# Patient Record
Sex: FEMALE | Race: WHITE | NOT HISPANIC OR LATINO | Employment: FULL TIME | URBAN - METROPOLITAN AREA
[De-identification: names, ages, dates, MRNs, and addresses within clinical notes are randomized per-mention and may not be internally consistent; named-entity substitution may affect disease eponyms.]

---

## 2019-09-11 ENCOUNTER — OFFICE VISIT (OUTPATIENT)
Dept: URGENT CARE | Facility: MEDICAL CENTER | Age: 24
End: 2019-09-11
Payer: COMMERCIAL

## 2019-09-11 VITALS
WEIGHT: 150 LBS | DIASTOLIC BLOOD PRESSURE: 70 MMHG | SYSTOLIC BLOOD PRESSURE: 108 MMHG | TEMPERATURE: 99.5 F | HEIGHT: 64 IN | OXYGEN SATURATION: 98 % | HEART RATE: 106 BPM | BODY MASS INDEX: 25.61 KG/M2 | RESPIRATION RATE: 16 BRPM

## 2019-09-11 DIAGNOSIS — H60.501 ACUTE OTITIS EXTERNA OF RIGHT EAR, UNSPECIFIED TYPE: ICD-10-CM

## 2019-09-11 DIAGNOSIS — H66.001 ACUTE SUPPURATIVE OTITIS MEDIA OF RIGHT EAR WITHOUT SPONTANEOUS RUPTURE OF TYMPANIC MEMBRANE, RECURRENCE NOT SPECIFIED: ICD-10-CM

## 2019-09-11 PROCEDURE — 99203 OFFICE O/P NEW LOW 30 MIN: CPT | Performed by: NURSE PRACTITIONER

## 2019-09-11 RX ORDER — AMOXICILLIN 500 MG/1
500 CAPSULE ORAL 2 TIMES DAILY
Qty: 20 CAP | Refills: 0 | Status: SHIPPED | OUTPATIENT
Start: 2019-09-11 | End: 2019-09-21

## 2019-09-11 RX ORDER — ETONOGESTREL/ETHINYL ESTRADIOL .12-.015MG
0.12 RING, VAGINAL VAGINAL DAILY
Refills: 0 | COMMUNITY
Start: 2019-08-28 | End: 2021-11-11

## 2019-09-11 SDOH — HEALTH STABILITY: MENTAL HEALTH: HOW OFTEN DO YOU HAVE A DRINK CONTAINING ALCOHOL?: MONTHLY OR LESS

## 2019-09-11 ASSESSMENT — ENCOUNTER SYMPTOMS
DIARRHEA: 0
SINUS PAIN: 0
ABDOMINAL PAIN: 0
RHINORRHEA: 0
VOMITING: 0
TINGLING: 0
SORE THROAT: 0
COUGH: 0
HEADACHES: 0
NECK PAIN: 0
NAUSEA: 0
DIZZINESS: 0
CHILLS: 1
FEVER: 0

## 2019-09-11 ASSESSMENT — LIFESTYLE VARIABLES: SUBSTANCE_ABUSE: 0

## 2019-09-11 NOTE — PROGRESS NOTES
Subjective:      Kyleigh Adams is a 23 y.o. female who presents with Otalgia ((R) x 1 day, stabbing pain)    Reviewed past medical, surgical and family history. Reviewed prescription and OTC medications with patient in electronic health record today.     No Known Allergies          Otalgia    There is pain in the right ear. This is a new problem. The current episode started in the past 7 days. The problem occurs constantly. The problem has been gradually worsening. The pain is at a severity of 6/10 (throbbing and sharp). The pain is moderate. Pertinent negatives include no abdominal pain, coughing, diarrhea, ear discharge, headaches, hearing loss, neck pain, rhinorrhea, sore throat or vomiting. She has tried acetaminophen for the symptoms. The treatment provided mild relief. There is no history of a chronic ear infection, hearing loss or a tympanostomy tube.       Review of Systems   Constitutional: Positive for chills. Negative for fever and malaise/fatigue.   HENT: Positive for ear pain. Negative for congestion, ear discharge, hearing loss, rhinorrhea, sinus pain, sore throat and tinnitus.    Respiratory: Negative for cough.    Gastrointestinal: Negative for abdominal pain, diarrhea, nausea and vomiting.   Musculoskeletal: Negative for neck pain.   Neurological: Negative for dizziness, tingling and headaches.   Psychiatric/Behavioral: Negative for substance abuse.          Objective:     /70   Pulse (!) 106   Temp 37.5 °C (99.5 °F)   Resp 16   Wt 68 kg (150 lb)   SpO2 98%      Physical Exam   Constitutional: She is oriented to person, place, and time. Vital signs are normal. She appears well-developed and well-nourished.   HENT:   Head: Normocephalic.   Right Ear: Hearing normal. There is tenderness. Tympanic membrane is erythematous and bulging. A middle ear effusion is present.   Left Ear: Hearing, tympanic membrane, external ear and ear canal normal.   Eyes: Conjunctivae are normal.   Neck: Normal  range of motion. Neck supple.   Cardiovascular: Normal rate and regular rhythm.   Pulmonary/Chest: Effort normal and breath sounds normal. No respiratory distress.   Musculoskeletal: Normal range of motion.   Lymphadenopathy:        Head (right side): No tonsillar adenopathy present.        Head (left side): No tonsillar adenopathy present.     She has no cervical adenopathy.        Right: No supraclavicular adenopathy present.        Left: No supraclavicular adenopathy present.   Neurological: She is alert and oriented to person, place, and time. She has normal reflexes.   Skin: Skin is warm and dry. Capillary refill takes less than 2 seconds.   Psychiatric: She has a normal mood and affect. Her speech is normal and behavior is normal. Judgment and thought content normal.   Nursing note and vitals reviewed.              Assessment/Plan:       1. Acute suppurative otitis media of right ear without spontaneous rupture of tympanic membrane, recurrence not specified  amoxicillin (AMOXIL) 500 MG Cap   2. Acute otitis externa of right ear, unspecified type  amoxicillin (AMOXIL) 500 MG Cap       Educated in proper administration of medication(s) ordered today including safety, possible SE, risks, benefits, rationale and alternatives to therapy.     Return to clinic or PCP  3-4  days if current symptoms are not resolving in a satisfactory manner or sooner if new or worsening symptoms occur.   Patient was advised of signs and symptoms which would warrant further evaluation and /or emergent evaluation in ER.  Verbalized agreement with this treatment plan and seemed to understand without barriers. Questions were encouraged and answered to patients satisfaction.     Warm pack prn discomfort to ear    OTC  analgesic of choice. Follow manufactures dosing and safety precautions.

## 2020-01-13 ENCOUNTER — HOSPITAL ENCOUNTER (OUTPATIENT)
Dept: LAB | Facility: MEDICAL CENTER | Age: 25
End: 2020-01-13
Attending: OBSTETRICS & GYNECOLOGY
Payer: COMMERCIAL

## 2020-01-13 PROCEDURE — 87591 N.GONORRHOEAE DNA AMP PROB: CPT

## 2020-01-13 PROCEDURE — 87491 CHLMYD TRACH DNA AMP PROBE: CPT

## 2020-01-14 LAB
C TRACH DNA SPEC QL NAA+PROBE: NEGATIVE
N GONORRHOEA DNA SPEC QL NAA+PROBE: NEGATIVE
SPECIMEN SOURCE: NORMAL

## 2020-08-17 ENCOUNTER — TELEPHONE (OUTPATIENT)
Dept: SCHEDULING | Facility: IMAGING CENTER | Age: 25
End: 2020-08-17

## 2020-08-26 ENCOUNTER — OFFICE VISIT (OUTPATIENT)
Dept: MEDICAL GROUP | Facility: MEDICAL CENTER | Age: 25
End: 2020-08-26
Payer: COMMERCIAL

## 2020-08-26 VITALS
TEMPERATURE: 97.7 F | DIASTOLIC BLOOD PRESSURE: 74 MMHG | OXYGEN SATURATION: 96 % | HEIGHT: 64 IN | RESPIRATION RATE: 16 BRPM | BODY MASS INDEX: 26.46 KG/M2 | WEIGHT: 155 LBS | HEART RATE: 100 BPM | SYSTOLIC BLOOD PRESSURE: 112 MMHG

## 2020-08-26 DIAGNOSIS — F33.1 MODERATE EPISODE OF RECURRENT MAJOR DEPRESSIVE DISORDER (HCC): ICD-10-CM

## 2020-08-26 PROBLEM — F32.9 MAJOR DEPRESSIVE DISORDER: Status: ACTIVE | Noted: 2020-08-26

## 2020-08-26 PROCEDURE — 99203 OFFICE O/P NEW LOW 30 MIN: CPT | Performed by: NURSE PRACTITIONER

## 2020-08-26 ASSESSMENT — PATIENT HEALTH QUESTIONNAIRE - PHQ9
SUM OF ALL RESPONSES TO PHQ QUESTIONS 1-9: 17
5. POOR APPETITE OR OVEREATING: 3 - NEARLY EVERY DAY
CLINICAL INTERPRETATION OF PHQ2 SCORE: 4

## 2020-08-26 ASSESSMENT — ENCOUNTER SYMPTOMS
DEPRESSION: 1
NERVOUS/ANXIOUS: 1

## 2020-08-26 NOTE — LETTER
[facility]  SHERRY NewtonPKATIA  [address] [address]  Fax: [phone]   Authorization for Release/Disclosure of   Protected Health Information   Name: SHYLA ADAMS : 1995 SSN: [id number]   Address: [address] [place] [address] Phone:    [phone] (home)    I authorize the entity listed below to release/disclose the PHI below to:   [facility]/ELY Newton and ELY Newton   Provider or Entity Name:     Address   City, State, Zip   Phone:      Fax:     Reason for request: continuity of care   Information to be released:    [  ] LAST COLONOSCOPY,  including any PATH REPORT and follow-up  [  ] LAST FIT/COLOGUARD RESULT [  ] LAST DEXA  [  ] LAST MAMMOGRAM  [  ] LAST PAP  [  ] LAST LABS [  ] RETINA EXAM REPORT  [  ] IMMUNIZATION RECORDS  [  ] Release all info      [  ] Check here and initial the line next to each item to release ALL health information INCLUDING  _____ Care and treatment for drug and / or alcohol abuse  _____ HIV testing, infection status, or AIDS  _____ Genetic Testing    DATES OF SERVICE OR TIME PERIOD TO BE DISCLOSED: _____________  I understand and acknowledge that:  * This Authorization may be revoked at any time by you in writing, except if your health information has already been used or disclosed.  * Your health information that will be used or disclosed as a result of you signing this authorization could be re-disclosed by the recipient. If this occurs, your re-disclosed health information may no longer be protected by State or Federal laws.  * You may refuse to sign this Authorization. Your refusal will not affect your ability to obtain treatment.  * This Authorization becomes effective upon signing and will  on (date) __________.      If no date is indicated, this Authorization will  one (1) year from the signature date.    Name: Shyla Adams    Signature:   Date:     2020       PLEASE FAX REQUESTED RECORDS BACK  TO: [phone]

## 2020-08-26 NOTE — PROGRESS NOTES
Subjective:      Kyleigh Adams is a 24 y.o. female who presents with Establish Care        CC: Patient is here today to establish care and for issues with anxiety and depression.    HPI         1. Moderate episode of recurrent major depressive disorder (HCC)  Patient reports that she was diagnosed with depression in high school and was on Lexapro for about 8 months which she found somewhat helpful.  She had been doing well up until COVID-19 started.  She states no she has persisting anxiety and depression.  Her PHQ 9 score is at 17.  She states she is not suicidal or homicidal.  She would like to start back on antidepressant medicines and go to counseling.  She currently works as an  at [employer] and is working mostly from home.  No past medical history on file.  Social History     Socioeconomic History   • Marital status:      Spouse name: Not on file   • Number of children: Not on file   • Years of education: Not on file   • Highest education level: Not on file   Occupational History   • Not on file   Social Needs   • Financial resource strain: Not on file   • Food insecurity     Worry: Not on file     Inability: Not on file   • Transportation needs     Medical: Not on file     Non-medical: Not on file   Tobacco Use   • Smoking status: Never Smoker   • Smokeless tobacco: Never Used   Substance and Sexual Activity   • Alcohol use: Yes     Frequency: Monthly or less   • Drug use: Never   • Sexual activity: Yes     Partners: Male     Birth control/protection: Inserts   Lifestyle   • Physical activity     Days per week: Not on file     Minutes per session: Not on file   • Stress: Not on file   Relationships   • Social connections     Talks on phone: Not on file     Gets together: Not on file     Attends [employer] service: Not on file     Active member of club or organization: Not on file     Attends meetings of clubs or organizations: Not on file     Relationship status: Not on file   •  "Intimate partner violence     Fear of current or ex partner: Not on file     Emotionally abused: Not on file     Physically abused: Not on file     Forced sexual activity: Not on file   Other Topics Concern   • Not on file   Social History Narrative   • Not on file     Current Outpatient Medications   Medication Sig Dispense Refill   • sertraline (ZOLOFT) 50 MG Tab Take 1 Tab by mouth every day. 90 Tab 3   • NUVARING 0.12-0.015 MG/24HR vaginal ring Insert 0.12 mg in vagina every day.  0     No current facility-administered medications for this visit.      Family History   Problem Relation Age of Onset   • Thyroid Mother    • Hypertension Father          Review of Systems   Psychiatric/Behavioral: Positive for depression. The patient is nervous/anxious.    All other systems reviewed and are negative.         Objective:     /74 (BP Location: Right arm, Patient Position: Sitting, BP Cuff Size: Adult)   Pulse 100   Temp 36.5 °C (97.7 °F) (Temporal)   Resp 16   Ht 1.626 m (5' 4\")   Wt 70.3 kg (155 lb)   SpO2 96%   BMI 26.61 kg/m²      Physical Exam  Vitals signs and nursing note reviewed.   Constitutional:       General: She is not in acute distress.     Appearance: She is well-developed. She is not diaphoretic.   HENT:      Head: Normocephalic and atraumatic.      Right Ear: External ear normal.      Left Ear: External ear normal.      Nose: Nose normal.   Eyes:      General:         Right eye: No discharge.         Left eye: No discharge.   Neck:      Musculoskeletal: Normal range of motion and neck supple.      Thyroid: No thyromegaly.   Cardiovascular:      Rate and Rhythm: Normal rate and regular rhythm.      Heart sounds: Normal heart sounds. No murmur. No friction rub. No gallop.    Pulmonary:      Effort: Pulmonary effort is normal.      Breath sounds: Normal breath sounds. No wheezing or rales.   Musculoskeletal:         General: No tenderness.   Skin:     General: Skin is warm and dry.      " Findings: No rash.   Neurological:      Mental Status: She is alert and oriented to person, place, and time.      Deep Tendon Reflexes: Reflexes normal.   Psychiatric:         Behavior: Behavior normal.         Thought Content: Thought content normal.         Judgment: Judgment normal.      Comments: Patient appears in no emotional distress in the office today.                 Assessment/Plan:        1. Moderate episode of recurrent major depressive disorder (HCC)  Patient's PHQ 9 score shows her to be with moderately severe depression as well as complaints of anxiety.  We discussed various options and she would like to try counseling.  I also discussed with her SSRI medication and she would be willing to try Zoloft.  She will start on half a tablet daily for the first week and then go to a full tablet.    Depression teaching included:    Discussed in depth with patient the pro's and con's of antidepressant therapy. I explained that it may take 2-4 wekks for the medication to take effect. Side effects discussed. Some people can have increased depression on these medications. If you should develope any homicidal or suicidal thoughts, you need to go to the emergency room immediatly for evaluation and possible admission. Otherwise I would like to see you back in two weeks to evaluate treatment success.    You should also start or continue counseling while on medication because antidepressents are a adjunct to treatment, not a substitute.  - Patient has been identified as having a positive depression screening. Appropriate orders and counseling have been given.  - REFERRAL TO PSYCHOLOGY  - sertraline (ZOLOFT) 50 MG Tab; Take 1 Tab by mouth every day.  Dispense: 90 Tab; Refill: 3

## 2021-01-20 ENCOUNTER — APPOINTMENT (OUTPATIENT)
Dept: RADIOLOGY | Facility: MEDICAL CENTER | Age: 26
End: 2021-01-20
Attending: EMERGENCY MEDICINE
Payer: COMMERCIAL

## 2021-01-20 ENCOUNTER — HOSPITAL ENCOUNTER (EMERGENCY)
Facility: MEDICAL CENTER | Age: 26
End: 2021-01-20
Attending: EMERGENCY MEDICINE
Payer: COMMERCIAL

## 2021-01-20 ENCOUNTER — NURSE TRIAGE (OUTPATIENT)
Dept: HEALTH INFORMATION MANAGEMENT | Facility: OTHER | Age: 26
End: 2021-01-20

## 2021-01-20 VITALS
OXYGEN SATURATION: 95 % | HEIGHT: 64 IN | RESPIRATION RATE: 16 BRPM | HEART RATE: 102 BPM | SYSTOLIC BLOOD PRESSURE: 111 MMHG | WEIGHT: 153.44 LBS | DIASTOLIC BLOOD PRESSURE: 73 MMHG | TEMPERATURE: 98.9 F | BODY MASS INDEX: 26.2 KG/M2

## 2021-01-20 DIAGNOSIS — S32.10XA CLOSED FRACTURE OF SACRUM, UNSPECIFIED PORTION OF SACRUM, INITIAL ENCOUNTER (HCC): ICD-10-CM
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
## 2021-01-20 PROCEDURE — 72220 X-RAY EXAM SACRUM TAILBONE: CPT

## 2021-01-20 PROCEDURE — 700102 HCHG RX REV CODE 250 W/ 637 OVERRIDE(OP): Performed by: EMERGENCY MEDICINE

## 2021-01-20 PROCEDURE — 99284 EMERGENCY DEPT VISIT MOD MDM: CPT

## 2021-01-20 PROCEDURE — A9270 NON-COVERED ITEM OR SERVICE: HCPCS | Performed by: EMERGENCY MEDICINE

## 2021-01-20 PROCEDURE — 72100 X-RAY EXAM L-S SPINE 2/3 VWS: CPT

## 2021-01-20 RX ORDER — HYDROCODONE BITARTRATE AND ACETAMINOPHEN 5; 325 MG/1; MG/1
1 TABLET ORAL EVERY 6 HOURS PRN
Qty: 15 TAB | Refills: 0 | Status: SHIPPED | OUTPATIENT
Start: 2021-01-20 | End: 2021-01-25

## 2021-01-20 RX ORDER — HYDROCODONE BITARTRATE AND ACETAMINOPHEN 5; 325 MG/1; MG/1
1 TABLET ORAL ONCE
Status: COMPLETED | OUTPATIENT
Start: 2021-01-20 | End: 2021-01-20

## 2021-01-20 RX ADMIN — HYDROCODONE BITARTRATE AND ACETAMINOPHEN 1 TABLET: 5; 325 TABLET ORAL at 18:32

## 2021-01-20 RX ADMIN — IBUPROFEN 800 MG: 600 TABLET ORAL at 18:33

## 2021-01-20 NOTE — TELEPHONE ENCOUNTER
"Pt fell down stairs today. Pt did not hit head per report. Pt is feeling dizziness and lightheadedness. Pt also has slight tingling in hands and lower back pain. Advised UC/ED per protocol. Pt scheduled appointment for UC.    Reason for Disposition  • SEVERE dizziness (e.g., unable to stand, requires support to walk, feels like passing out now)    Additional Information  • Negative: Shock suspected (e.g., cold/pale/clammy skin, too weak to stand, low BP, rapid pulse)  • Negative: Difficult to awaken or acting confused (e.g., disoriented, slurred speech)  • Negative: Fainted, and still feels dizzy afterwards  • Negative: SEVERE difficulty breathing (e.g., struggling for each breath, speaks in single words)  • Negative: Overdose (accidental or intentional) of medications  • Negative: New neurologic deficit that is present now: * Weakness of the face, arm, or leg on one side of the body * Numbness of the face, arm, or leg on one side of the body * Loss of speech or garbled speech  • Negative: Heart beating < 50 beats per minute OR > 140 beats per minute  • Negative: Sounds like a life-threatening emergency to the triager  • Negative: Chest pain  • Negative: Rectal bleeding, bloody stool, or tarry-black stool  • Negative: Vomiting is the main symptom  • Negative: Diarrhea is the main symptom  • Negative: Headache is the main symptom  • Negative: Heat exhaustion suspected (i.e., dehydration from heat exposure)  • Negative: Patient states that he/she is having an anxiety/panic attack    Answer Assessment - Initial Assessment Questions  1. DESCRIPTION: \"Describe your dizziness.\"      When she gets up to go anywhere  2. LIGHTHEADED: \"Do you feel lightheaded?\" (e.g., somewhat faint, woozy, weak upon standing)      unknown  3. VERTIGO: \"Do you feel like either you or the room is spinning or tilting?\" (i.e. vertigo)      no  4. SEVERITY: \"How bad is it?\"  \"Do you feel like you are going to faint?\" \"Can you stand and walk?\"    " "- MILD - walking normally    - MODERATE - interferes with normal activities (e.g., work, school)     - SEVERE - unable to stand, requires support to walk, feels like passing out now.       MODERATE  5. ONSET:  \"When did the dizziness begin?\"      today  6. AGGRAVATING FACTORS: \"Does anything make it worse?\" (e.g., standing, change in head position)      standing  7. HEART RATE: \"Can you tell me your heart rate?\" \"How many beats in 15 seconds?\"  (Note: not all patients can do this)        no  8. CAUSE: \"What do you think is causing the dizziness?\"      fall  9. RECURRENT SYMPTOM: \"Have you had dizziness before?\" If so, ask: \"When was the last time?\" \"What happened that time?\"      no  10. OTHER SYMPTOMS: \"Do you have any other symptoms?\" (e.g., fever, chest pain, vomiting, diarrhea, bleeding)        nausea  11. PREGNANCY: \"Is there any chance you are pregnant?\" \"When was your last menstrual period?\"        unknown    Protocols used: DIZZINESS-A-OH      "

## 2021-01-21 NOTE — ED TRIAGE NOTES
Fell down flight of carpeted stairs. Low back pain. Had vision changes, shaking, and nausea. Now only back pain. No LOC or vomiting.

## 2021-01-21 NOTE — DISCHARGE INSTRUCTIONS
Use ice packs for the next 48 hours and then as needed for pain after that.  20 minutes on 20 minutes off as best.  Buy a donut do not sitting directly on your sacrum avoid constipation and avoid any direct pressure trauma to the area.    Do not drive, operate any machinery, or partake in dangerous activities that require maximum physical and mental performance while taking the prescribed pain killer. Do not take tylenol or tylenol containing products in addition to the pain killer that was prescibed, as the excess tylenol can cause life threatening liver problems. Do not combine this drug with alcohol or other sedatives or narcotics. Follow up with your primary care doctor in regards to the future management of this medication.

## 2021-01-21 NOTE — ED NOTES
Patient verbalized understanding of discharge instructions including order not to drive or drink alcohol for 6-12 hrs following narcotic use, no questions at this time. Pt slightly tachycardic, reports hospitals make her anxious. VS otherwise stable, patient will ambulate to exit with d/c instructions and rx in hand. Narcotic consent for discussed with pt, pt verbalized understanding and gave written consent.

## 2021-01-21 NOTE — ED PROVIDER NOTES
"ED Provider Note    CHIEF COMPLAINT  Chief Complaint   Patient presents with   • T-5000 FALL     fell down carpeted flight of stairs. Pain to low back initially and then vision changes, nausea, and shaking       HPI  Kyleigh Adams is a 25 y.o. female who presents to the emergency department chief complaint of slip and fall downstairs this morning.  She states she missed the second step and fell down several stairs mostly on her back she landed on her butt primarily and then slid down further from there.  She felt a jolting sensation upper back but did not hit her head or lose consciousness.  She states she slowly went back upstairs and lay down for a while and then was unable to sleep secondary to pain and when she stood up she got lightheaded a little early visual blackness and will nausea but not all improved after she sat back down.  She still having lower back pain no headache no current nausea no weakness numbness or tingling.  Pain is currently about a 6 out of 10 becomes worse when she sits or ambulates.    REVIEW OF SYSTEMS  Positives as above. Pertinent negatives include headache neck stiffness easy bleeding or bruising unilateral weakness numbness or tingling loss of consciousness  All other review of systems are negative    PAST MEDICAL HISTORY       SOCIAL HISTORY  Social History     Tobacco Use   • Smoking status: Never Smoker   • Smokeless tobacco: Never Used   Substance and Sexual Activity   • Alcohol use: Yes     Frequency: Monthly or less   • Drug use: Never   • Sexual activity: Yes     Partners: Male     Birth control/protection: Inserts       SURGICAL HISTORY  patient denies any surgical history    CURRENT MEDICATIONS  Home Medications    **Home medications have not yet been reviewed for this encounter**         ALLERGIES  No Known Allergies    PHYSICAL EXAM  VITAL SIGNS: /77   Pulse (!) 120   Temp 37.1 °C (98.7 °F) (Temporal)   Resp 16   Ht 1.626 m (5' 4\")   Wt 69.6 kg (153 lb 7 oz)   " LMP 01/13/2021   SpO2 96%   Breastfeeding No   BMI 26.34 kg/m²    Pulse ox interpretation: I interpret this pulse ox as normal.  Constitutional: Alert in no apparent distress.  HENT: Normocephalic atraumatic, MMM  Eyes: PER, Conjunctiva normal, Non-icteric.   Neck: Normal range of motion, No tenderness, Supple, No stridor.   Cardiovascular: Regular rate and rhythm, no murmurs.   Thorax & Lungs: Normal breath sounds, No respiratory distress, No wheezing, No chest tenderness.   Abdomen: Bowel sounds normal, Soft, No tenderness, No pulsatile masses. No peritoneal signs.  Skin: Warm, Dry, No erythema, No rash.   Back: Does have tenderness around the upper L-spine midline with no step-offs or swelling and tenderness mostly at the bottom of the sacrum without crepitus, No CVA tenderness.   Extremities/MSK: Intact equal distal pulses, No edema, No tenderness, No cyanosis, no major deformities noted  Neurologic: Alert and oriented x3,Symmetric smile, eyes shut tight bilaterally, forehead wrinkles bilaterally, sensation intact to light touch bilateral face, tongue midline, head turn and shoulder shrug with full strength. Hearing intact grossly bilaterally. 5/5  strength bilaterally, 5/5 tricep and bicep strength bilaterally. Sensation intact to light touch r, m, u, axillary nerves bilaterally. 5/5 strength quadricep, plantarflexion/dorsiflexion/extensor hallicus longus bilaterally. Sensation intact to light touch bilateral lower extremities in all nerve distributions.  Intact finger-to-nose, no pronator drift, negative rhomberg, steady gait        DIFFERENTIAL DIAGNOSIS AND WORK UP PLAN    This is a 25 y.o. female who presents with trauma falling on some stairs but did not hit her head or lose consciousness sounds like she had maybe a vasovagal episode when she went to stand after having been lying down for long time in the setting of pain.  This resolved with sitting she never syncopized no midline neck tenderness  her head neck are cleared by Nexus criteria.  She mostly has L-spine and coccyx discomfort.  She was tachycardic on arrival but is not tachycardic on my examination.  She tested Tylenol 3 hours ago we will treat her with some oral ibuprofen and ice pack and perform plain films.    DIAGNOSTIC STUDIES / PROCEDURES    RADIOLOGY  DX-LUMBAR SPINE-2 OR 3 VIEWS   Final Result      No significant spondylosis. No acute fracture or listhesis.      DX-SACRUM AND COCCYX 2+   Final Result      Acute mildly displaced fracture of the distal sacrum        The radiologist's interpretation of all radiological studies have been reviewed by me.      COURSE & MEDICAL DECISION MAKING  Pertinent Labs & Imaging studies reviewed. (See chart for details)    6:23 PM  I reassessed the patient she does have mildly displaced fracture of the distal sacrum this is past the hour or the wings and is not involving the rest of the pelvis.  Discussed with her she had a mild fracture which can take several weeks to heal we discussed buying a donut and referral to orthopedic surgery but this does not require surgical management will most likely heal on its own.  She thinks that he started with pain straining hard time resting or sleeping which I will definitely give her sugar but single dose here prior to discharge we discussed rice treatment for the first 48 hours and return precautions and to avoid constipation she understands feels comfortable going home    In prescribing controlled substances to this patient, I certify that I have obtained and reviewed the medical history of Kyleigh Adams. I have also made a good mt effort to obtain applicable records from other providers who have treated the patient and records did not demonstrate any increased risk of substance abuse that would prevent me from prescribing controlled substances.     I have conducted a physical exam and documented it. I have reviewed Ms. Adams’s prescription history as  "maintained by the [place] Prescription Monitoring Program.     I have assessed the patient’s risk for abuse, dependency, and addiction using the validated Opioid Risk Tool available at https://www.mdcalc.com/oooofq-jqwh-jfpw-ort-narcotic-abuse. 0    Given the above, I believe the benefits of controlled substance therapy outweigh the risks. The reasons for prescribing controlled substances include non-narcotic, oral analgesic alternatives have been inadequate for pain control. Accordingly, I have discussed the risk and benefits, treatment plan, and alternative therapies with the patient.     /73   Pulse (!) 102   Temp 37.2 °C (98.9 °F) (Temporal)   Resp 16   Ht 1.626 m (5' 4\")   Wt 69.6 kg (153 lb 7 oz)   LMP 01/13/2021   SpO2 95%   Breastfeeding No   BMI 26.34 kg/m²       I verified that the patient was wearing a mask and I was wearing appropriate PPE every time I entered the room. The patient's mask was on the patient at all times during my encounter except for a brief view of the oropharynx.    The patient will return for new or worsening symptoms and is stable at the time of discharge.    The patient is referred to a primary physician for blood pressure management, diabetic screening, and for all other preventative health concerns.    DISPOSITION:  Patient will be discharged home in stable condition.    FOLLOW UP:  Apolinar Griffin M.D.  [address]  [address]  [facility] [id number] [phone]    Schedule an appointment as soon as possible for a visit       [facility], Emergency Dept  [address]  [facility] [id number] [phone]    If symptoms worsen    [facility]  [address]  [address]  [facility] [address] [phone]    Schedule an appointment as soon as possible for a visit         OUTPATIENT MEDICATIONS:  New Prescriptions    HYDROCODONE-ACETAMINOPHEN (NORCO) 5-325 MG TAB PER TABLET    Take 1 Tab by mouth every 6 hours as needed for " up to 5 days.           FINAL IMPRESSION  1. Fall, initial encounter     2. Closed fracture of sacrum, Tail of sacrum, initial encounter (HCC)  HYDROcodone-acetaminophen (NORCO) 5-325 MG Tab per tablet           Electronically signed by: Emmy Julian M.D., 1/20/2021 5:39 PM    This dictation has been created using voice recognition software and/or scribes. The accuracy of the dictation is limited by the abilities of the software and the expertise of the scribes. I expect there may be some errors of grammar and possibly content. I made every attempt to manually correct the errors within my dictation. However, errors related to voice recognition software and/or scribes may still exist and should be interpreted within the appropriate context.

## 2021-01-21 NOTE — ED NOTES
Pt reports pain in lower back with movement but states that she has full ROM. Pt handed call light and declines any questions at this time.

## 2021-02-10 ENCOUNTER — HOSPITAL ENCOUNTER (OUTPATIENT)
Dept: RADIOLOGY | Facility: MEDICAL CENTER | Age: 26
End: 2021-02-10
Attending: OBSTETRICS & GYNECOLOGY
Payer: COMMERCIAL

## 2021-02-10 DIAGNOSIS — N63.10 MASS OF RIGHT BREAST: ICD-10-CM

## 2021-02-10 PROCEDURE — 76642 ULTRASOUND BREAST LIMITED: CPT | Mod: RT

## 2021-02-22 ENCOUNTER — OFFICE VISIT (OUTPATIENT)
Dept: MEDICAL GROUP | Facility: MEDICAL CENTER | Age: 26
End: 2021-02-22
Payer: COMMERCIAL

## 2021-02-22 VITALS
SYSTOLIC BLOOD PRESSURE: 124 MMHG | RESPIRATION RATE: 16 BRPM | BODY MASS INDEX: 25.44 KG/M2 | OXYGEN SATURATION: 95 % | HEIGHT: 64 IN | HEART RATE: 100 BPM | WEIGHT: 149 LBS | DIASTOLIC BLOOD PRESSURE: 72 MMHG

## 2021-02-22 DIAGNOSIS — F33.1 MODERATE EPISODE OF RECURRENT MAJOR DEPRESSIVE DISORDER (HCC): ICD-10-CM

## 2021-02-22 DIAGNOSIS — Z30.09 FAMILY PLANNING: ICD-10-CM

## 2021-02-22 DIAGNOSIS — M54.9 UPPER BACK PAIN: ICD-10-CM

## 2021-02-22 DIAGNOSIS — Z00.00 ROUTINE GENERAL MEDICAL EXAMINATION AT A HEALTH CARE FACILITY: ICD-10-CM

## 2021-02-22 PROCEDURE — 99395 PREV VISIT EST AGE 18-39: CPT | Performed by: NURSE PRACTITIONER

## 2021-02-22 ASSESSMENT — ENCOUNTER SYMPTOMS
BACK PAIN: 1
DEPRESSION: 1

## 2021-02-22 NOTE — PROGRESS NOTES
Subjective:      Kyleigh Adams is a 25 y.o. female who presents with Follow-Up        cc: Patient is here today for general checkup.    HPI   1. Routine general medical examination at a health care facility  Patient states she feels generally healthy but has had a number of medical issues this year including a mild case of COVID-19 last month and a recent sacrum fracture.  She also states that she and her  are trying to get pregnant.    2. Upper back pain  Patient states her coccyx area no longer causes her pain but she does have some mild pain in her upper back.  She states it is mostly between her scapula in the mid back but it is not severe and does not radiate into the arms or abdomen.  She is thinking of going to a chiropractor.    3. Family planning  Patient states she would like a referral to a gynecologist because she and her  plan on getting pregnant soon and she would like to get some advice.  She did have a recent diagnostic mammogram and breast ultrasound because of a potential lump found on exam but the results came back normal.    4. Moderate episode of recurrent major depressive disorder (HCC)  Patient continues on Zoloft which she has been on for a few months and has found it very helpful and wishes to continue this.      History reviewed. No pertinent past medical history.  Social History     Socioeconomic History   • Marital status:      Spouse name: Not on file   • Number of children: Not on file   • Years of education: Not on file   • Highest education level: Not on file   Occupational History   • Not on file   Tobacco Use   • Smoking status: Never Smoker   • Smokeless tobacco: Never Used   Substance and Sexual Activity   • Alcohol use: Yes   • Drug use: Never   • Sexual activity: Yes     Partners: Male     Birth control/protection: Inserts   Other Topics Concern   • Not on file   Social History Narrative   • Not on file     Social Determinants of Health     Financial  Resource Strain:    • Difficulty of Paying Living Expenses:    Food Insecurity:    • Worried About Running Out of Food in the Last Year:    • Ran Out of Food in the Last Year:    Transportation Needs:    • Lack of Transportation (Medical):    • Lack of Transportation (Non-Medical):    Physical Activity:    • Days of Exercise per Week:    • Minutes of Exercise per Session:    Stress:    • Feeling of Stress :    Social Connections:    • Frequency of Communication with Friends and Family:    • Frequency of Social Gatherings with Friends and Family:    • Attends Church Services:    • Active Member of Clubs or Organizations:    • Attends Club or Organization Meetings:    • Marital Status:    Intimate Partner Violence:    • Fear of Current or Ex-Partner:    • Emotionally Abused:    • Physically Abused:    • Sexually Abused:      Current Outpatient Medications   Medication Sig Dispense Refill   • sertraline (ZOLOFT) 50 MG Tab Take 1 Tab by mouth every day. 90 Tab 3   • NUVARING 0.12-0.015 MG/24HR vaginal ring Insert 0.12 mg in vagina every day.  0     No current facility-administered medications for this visit.     Family History   Problem Relation Age of Onset   • Thyroid Mother    • Hypertension Father        History reviewed. No pertinent past medical history.  Social History     Socioeconomic History   • Marital status:      Spouse name: Not on file   • Number of children: Not on file   • Years of education: Not on file   • Highest education level: Not on file   Occupational History   • Not on file   Tobacco Use   • Smoking status: Never Smoker   • Smokeless tobacco: Never Used   Substance and Sexual Activity   • Alcohol use: Yes   • Drug use: Never   • Sexual activity: Yes     Partners: Male     Birth control/protection: Inserts   Other Topics Concern   • Not on file   Social History Narrative   • Not on file     Social Determinants of Health     Financial Resource Strain:    • Difficulty of Paying Living  "Expenses:    Food Insecurity:    • Worried About Running Out of Food in the Last Year:    • Ran Out of Food in the Last Year:    Transportation Needs:    • Lack of Transportation (Medical):    • Lack of Transportation (Non-Medical):    Physical Activity:    • Days of Exercise per Week:    • Minutes of Exercise per Session:    Stress:    • Feeling of Stress :    Social Connections:    • Frequency of Communication with Friends and Family:    • Frequency of Social Gatherings with Friends and Family:    • Attends [employer] Services:    • Active Member of Clubs or Organizations:    • Attends Club or Organization Meetings:    • Marital Status:    Intimate Partner Violence:    • Fear of Current or Ex-Partner:    • Emotionally Abused:    • Physically Abused:    • Sexually Abused:      Current Outpatient Medications   Medication Sig Dispense Refill   • sertraline (ZOLOFT) 50 MG Tab Take 1 Tab by mouth every day. 90 Tab 3   • NUVARING 0.12-0.015 MG/24HR vaginal ring Insert 0.12 mg in vagina every day.  0     No current facility-administered medications for this visit.     Family History   Problem Relation Age of Onset   • Thyroid Mother    • Hypertension Father          Review of Systems   Musculoskeletal: Positive for back pain.   Psychiatric/Behavioral: Positive for depression.   All other systems reviewed and are negative.         Objective:     /72 (BP Location: Right arm, Patient Position: Sitting, BP Cuff Size: Adult)   Pulse 100   Resp 16   Ht 1.626 m (5' 4\")   Wt 67.6 kg (149 lb)   SpO2 95%   BMI 25.58 kg/m²      Physical Exam  Vitals and nursing note reviewed.   Constitutional:       General: She is not in acute distress.     Appearance: She is well-developed. She is not diaphoretic.   HENT:      Head: Normocephalic and atraumatic.      Right Ear: External ear normal.      Left Ear: External ear normal.      Nose: Nose normal.   Eyes:      General:         Right eye: No discharge.         Left eye: No " discharge.   Neck:      Thyroid: No thyromegaly.   Cardiovascular:      Rate and Rhythm: Normal rate and regular rhythm.      Heart sounds: Normal heart sounds. No murmur. No friction rub. No gallop.    Pulmonary:      Effort: Pulmonary effort is normal.      Breath sounds: Normal breath sounds. No wheezing or rales.   Musculoskeletal:         General: No tenderness.      Cervical back: Normal range of motion and neck supple.      Comments: Patient has good range of motion of upper extremities and no obvious pain in the office   Skin:     General: Skin is warm and dry.      Findings: No rash.   Neurological:      Mental Status: She is alert and oriented to person, place, and time.      Deep Tendon Reflexes: Reflexes normal.   Psychiatric:         Behavior: Behavior normal.         Thought Content: Thought content normal.         Judgment: Judgment normal.      Comments: Patient is pleasant and talkative and demonstrates no active depression in the office                 Assessment/Plan:        1. Routine general medical examination at a health care facility  Patient will do routine lab work and follow-up if necessary pending results.  - Comp Metabolic Panel; Future  - Lipid Profile; Future  - TSH; Future    2. Upper back pain  I offered to refer patient to physical therapy but she would like to first try a chiropractor who she knows.  I told her if it does not improve then we should consider referral to physical therapy or physiatry.    3. Family planning  Patient like to establish with a new gynecologist to discuss her wish to become pregnant.  She states her periods are regular and she has had no real problems but needs a new GYN.  - REFERRAL TO OB/GYN    4. Moderate episode of recurrent major depressive disorder (HCC)  Patient feels that her Zoloft has been working well for her and wishes to continue it.  We did discuss potential of pregnancy and medication.

## 2021-03-04 ENCOUNTER — HOSPITAL ENCOUNTER (OUTPATIENT)
Dept: LAB | Facility: MEDICAL CENTER | Age: 26
End: 2021-03-04
Attending: NURSE PRACTITIONER
Payer: COMMERCIAL

## 2021-03-04 DIAGNOSIS — Z00.00 ROUTINE GENERAL MEDICAL EXAMINATION AT A HEALTH CARE FACILITY: ICD-10-CM

## 2021-03-04 LAB
ALBUMIN SERPL BCP-MCNC: 4.2 G/DL (ref 3.2–4.9)
ALBUMIN/GLOB SERPL: 1.3 G/DL
ALP SERPL-CCNC: 67 U/L (ref 30–99)
ALT SERPL-CCNC: 18 U/L (ref 2–50)
ANION GAP SERPL CALC-SCNC: 12 MMOL/L (ref 7–16)
AST SERPL-CCNC: 24 U/L (ref 12–45)
BILIRUB SERPL-MCNC: 0.4 MG/DL (ref 0.1–1.5)
BUN SERPL-MCNC: 8 MG/DL (ref 8–22)
CALCIUM SERPL-MCNC: 9.4 MG/DL (ref 8.4–10.2)
CHLORIDE SERPL-SCNC: 104 MMOL/L (ref 96–112)
CHOLEST SERPL-MCNC: 190 MG/DL (ref 100–199)
CO2 SERPL-SCNC: 21 MMOL/L (ref 20–33)
CREAT SERPL-MCNC: 0.69 MG/DL (ref 0.5–1.4)
FASTING STATUS PATIENT QL REPORTED: NORMAL
GLOBULIN SER CALC-MCNC: 3.3 G/DL (ref 1.9–3.5)
GLUCOSE SERPL-MCNC: 90 MG/DL (ref 65–99)
HDLC SERPL-MCNC: 68 MG/DL
LDLC SERPL CALC-MCNC: 106 MG/DL
POTASSIUM SERPL-SCNC: 3.5 MMOL/L (ref 3.6–5.5)
PROT SERPL-MCNC: 7.5 G/DL (ref 6–8.2)
SODIUM SERPL-SCNC: 137 MMOL/L (ref 135–145)
TRIGL SERPL-MCNC: 82 MG/DL (ref 0–149)
TSH SERPL DL<=0.005 MIU/L-ACNC: 1.43 UIU/ML (ref 0.38–5.33)

## 2021-03-04 PROCEDURE — 80061 LIPID PANEL: CPT

## 2021-03-04 PROCEDURE — 84443 ASSAY THYROID STIM HORMONE: CPT

## 2021-03-04 PROCEDURE — 36415 COLL VENOUS BLD VENIPUNCTURE: CPT

## 2021-03-04 PROCEDURE — 80053 COMPREHEN METABOLIC PANEL: CPT

## 2021-04-28 ENCOUNTER — IMMUNIZATION (OUTPATIENT)
Dept: FAMILY PLANNING/WOMEN'S HEALTH CLINIC | Facility: IMMUNIZATION CENTER | Age: 26
End: 2021-04-28
Payer: COMMERCIAL

## 2021-04-28 DIAGNOSIS — Z23 ENCOUNTER FOR VACCINATION: Primary | ICD-10-CM

## 2021-04-28 PROCEDURE — 0001A PFIZER SARS-COV-2 VACCINE: CPT

## 2021-04-28 PROCEDURE — 91300 PFIZER SARS-COV-2 VACCINE: CPT

## 2021-05-11 ENCOUNTER — GYNECOLOGY VISIT (OUTPATIENT)
Dept: OBGYN | Facility: CLINIC | Age: 26
End: 2021-05-11
Payer: COMMERCIAL

## 2021-05-11 VITALS — BODY MASS INDEX: 26.26 KG/M2 | DIASTOLIC BLOOD PRESSURE: 65 MMHG | WEIGHT: 153 LBS | SYSTOLIC BLOOD PRESSURE: 129 MMHG

## 2021-05-11 DIAGNOSIS — Z31.69 ENCOUNTER FOR PRECONCEPTION CONSULTATION: ICD-10-CM

## 2021-05-11 PROCEDURE — 99203 OFFICE O/P NEW LOW 30 MIN: CPT | Performed by: OBSTETRICS & GYNECOLOGY

## 2021-05-11 RX ORDER — ETONOGESTREL AND ETHINYL ESTRADIOL VAGINAL RING .015; .12 MG/D; MG/D
RING VAGINAL
Qty: 3 EACH | Refills: 3 | Status: SHIPPED | OUTPATIENT
Start: 2021-05-11 | End: 2021-11-11

## 2021-05-11 NOTE — NON-PROVIDER
New pt here today for GYN appt.  Establishing care with us.  Phone # [phone]  Pharmacy verified.  Hx of an ovarian cyst  Discuss conception

## 2021-05-11 NOTE — PROGRESS NOTES
Chief Complaint   Patient presents with   • Gynecologic Exam       History of present illness: 25 y.o. presents with establishment of care.  Additionally she is thinking about having a baby with her  in the next year and would like to discuss this further.    Review of systems:  Pertinent positives documented in HPI and all other systems reviewed & are negative      PGYNHx:   Menarche at age 10.  LMP 5/5/2021.  Interval between cycles 28 days.  Length of flow 3 to 4 days.  Positive sexual activity with male partners, 3 lifetime partners, 1 current male partner.  History of contraception: Condoms, Ortho Tri-Cyclen pills, currently NuvaRing.  Last Pap smear 1 year ago  History of chlamydia which was treated years ago.    POBHx: Nulliparous.    All PMH, PSH, allergies, social history and FH reviewed and updated today:  Past Medical History:   Diagnosis Date   • Depression        History reviewed. No pertinent surgical history.    Allergies:   Allergies   Allergen Reactions   • Miralax [Polyethylene Glycol] Runny Nose and Itching     Itchy throat, and runny nose       Social History     Socioeconomic History   • Marital status:      Spouse name: Not on file   • Number of children: Not on file   • Years of education: Not on file   • Highest education level: Not on file   Occupational History   • Not on file   Tobacco Use   • Smoking status: Never Smoker   • Smokeless tobacco: Never Used   Substance and Sexual Activity   • Alcohol use: Yes   • Drug use: Never   • Sexual activity: Yes     Partners: Male     Birth control/protection: Inserts   Other Topics Concern   • Not on file   Social History Narrative   • Not on file     Social Determinants of Health     Financial Resource Strain:    • Difficulty of Paying Living Expenses:    Food Insecurity:    • Worried About Running Out of Food in the Last Year:    • Ran Out of Food in the Last Year:    Transportation Needs:    • Lack of Transportation (Medical):    •  Lack of Transportation (Non-Medical):    Physical Activity:    • Days of Exercise per Week:    • Minutes of Exercise per Session:    Stress:    • Feeling of Stress :    Social Connections:    • Frequency of Communication with Friends and Family:    • Frequency of Social Gatherings with Friends and Family:    • Attends [employer] Services:    • Active Member of Clubs or Organizations:    • Attends Club or Organization Meetings:    • Marital Status:    Intimate Partner Violence:    • Fear of Current or Ex-Partner:    • Emotionally Abused:    • Physically Abused:    • Sexually Abused:        Family History   Problem Relation Age of Onset   • Thyroid Mother    • Hypertension Father    • Thyroid Father    • Cancer Maternal Grandfather         skin   • Cancer Paternal Grandfather        Physical exam:  /65 (BP Location: Right arm, Patient Position: Sitting)   Wt 69.4 kg (153 lb)     General:appears stated age, is in no apparent distress  Head: normocephalic, non-tender  Neck: neck is supple, no jugular venous distension  CV : regular rate and rhythm, no peripheral edema  Lungs: Normal respiratory effort. Clear to auscultation bilaterally  Abdomen: Bowel sounds positive, nondistended, soft, nontender x4, no rebound or guarding. No organomegaly. No masses.  Skin: No rashes, or ulcers or lesions seen  Psychiatric: Patient shows appropriate affect, is alert and oriented x3, intact judgment and insight.      Assessment  1. Encounter for preconception consultation         Plan  - 25 y.o.  here for establish care and preconception counseling.    Advised patient that sertraline is a antidepressant which is safe in pregnancy.  Recommended her to start taking a multivitamin such as prenatal vitamins now.  Recommend that she avoids alcohol, tobacco, and illicit drugs while she is trying for pregnancy.  We briefly discussed timed intercourse.  She is recommended to call office for a appointment for confirmation of  pregnancy after she misses her menses and has a positive pregnancy test.    All patient's questions were answered    We will request patient's previous Pap smear from other provider    - Follow up as needed

## 2021-05-21 ENCOUNTER — IMMUNIZATION (OUTPATIENT)
Dept: FAMILY PLANNING/WOMEN'S HEALTH CLINIC | Facility: IMMUNIZATION CENTER | Age: 26
End: 2021-05-21
Payer: COMMERCIAL

## 2021-05-21 DIAGNOSIS — Z23 ENCOUNTER FOR VACCINATION: Primary | ICD-10-CM

## 2021-05-21 PROCEDURE — 91300 PFIZER SARS-COV-2 VACCINE: CPT | Performed by: INTERNAL MEDICINE

## 2021-05-21 PROCEDURE — 0002A PFIZER SARS-COV-2 VACCINE: CPT | Performed by: INTERNAL MEDICINE

## 2021-08-15 DIAGNOSIS — F33.1 MODERATE EPISODE OF RECURRENT MAJOR DEPRESSIVE DISORDER (HCC): ICD-10-CM

## 2021-11-11 ENCOUNTER — OFFICE VISIT (OUTPATIENT)
Dept: MEDICAL GROUP | Facility: MEDICAL CENTER | Age: 26
End: 2021-11-11
Payer: COMMERCIAL

## 2021-11-11 VITALS
WEIGHT: 158.51 LBS | TEMPERATURE: 98.1 F | DIASTOLIC BLOOD PRESSURE: 68 MMHG | SYSTOLIC BLOOD PRESSURE: 124 MMHG | HEART RATE: 101 BPM | OXYGEN SATURATION: 97 % | BODY MASS INDEX: 27.06 KG/M2 | HEIGHT: 64 IN

## 2021-11-11 DIAGNOSIS — D23.4 DERMOID CYST OF SCALP: ICD-10-CM

## 2021-11-11 DIAGNOSIS — F41.9 ANXIETY: ICD-10-CM

## 2021-11-11 DIAGNOSIS — F33.1 MODERATE EPISODE OF RECURRENT MAJOR DEPRESSIVE DISORDER (HCC): ICD-10-CM

## 2021-11-11 DIAGNOSIS — Z23 IMMUNIZATION DUE: ICD-10-CM

## 2021-11-11 DIAGNOSIS — Z12.83 SCREENING FOR SKIN CANCER: ICD-10-CM

## 2021-11-11 DIAGNOSIS — E78.5 DYSLIPIDEMIA: ICD-10-CM

## 2021-11-11 PROCEDURE — 90651 9VHPV VACCINE 2/3 DOSE IM: CPT | Performed by: FAMILY MEDICINE

## 2021-11-11 PROCEDURE — 90471 IMMUNIZATION ADMIN: CPT | Performed by: FAMILY MEDICINE

## 2021-11-11 PROCEDURE — 90472 IMMUNIZATION ADMIN EACH ADD: CPT | Performed by: FAMILY MEDICINE

## 2021-11-11 PROCEDURE — 99214 OFFICE O/P EST MOD 30 MIN: CPT | Mod: 25 | Performed by: FAMILY MEDICINE

## 2021-11-11 PROCEDURE — 90686 IIV4 VACC NO PRSV 0.5 ML IM: CPT | Performed by: FAMILY MEDICINE

## 2021-11-11 ASSESSMENT — PATIENT HEALTH QUESTIONNAIRE - PHQ9
2. FEELING DOWN, DEPRESSED, IRRITABLE, OR HOPELESS: NOT AT ALL
SUM OF ALL RESPONSES TO PHQ9 QUESTIONS 1 AND 2: 0
7. TROUBLE CONCENTRATING ON THINGS, SUCH AS READING THE NEWSPAPER OR WATCHING TELEVISION: SEVERAL DAYS
6. FEELING BAD ABOUT YOURSELF - OR THAT YOU ARE A FAILURE OR HAVE LET YOURSELF OR YOUR FAMILY DOWN: SEVERAL DAYS
1. LITTLE INTEREST OR PLEASURE IN DOING THINGS: NOT AT ALL
CLINICAL INTERPRETATION OF PHQ2 SCORE: 0
8. MOVING OR SPEAKING SO SLOWLY THAT OTHER PEOPLE COULD HAVE NOTICED. OR THE OPPOSITE, BEING SO FIGETY OR RESTLESS THAT YOU HAVE BEEN MOVING AROUND A LOT MORE THAN USUAL: NOT AT ALL
SUM OF ALL RESPONSES TO PHQ QUESTIONS 1-9: 6
3. TROUBLE FALLING OR STAYING ASLEEP OR SLEEPING TOO MUCH: MORE THAN HALF THE DAYS
5. POOR APPETITE OR OVEREATING: NOT AT ALL
9. THOUGHTS THAT YOU WOULD BE BETTER OFF DEAD, OR OF HURTING YOURSELF: NOT AT ALL
4. FEELING TIRED OR HAVING LITTLE ENERGY: MORE THAN HALF THE DAYS

## 2021-11-11 ASSESSMENT — ENCOUNTER SYMPTOMS
CONSTIPATION: 0
NERVOUS/ANXIOUS: 0
SORE THROAT: 0
SHORTNESS OF BREATH: 0
DEPRESSION: 0
PALPITATIONS: 0
CHILLS: 0
DOUBLE VISION: 0
WEIGHT LOSS: 0
COUGH: 0
BLURRED VISION: 0
DIZZINESS: 0
MYALGIAS: 0
FEVER: 0
BRUISES/BLEEDS EASILY: 0
DIARRHEA: 0
WEAKNESS: 0
TINGLING: 0
VOMITING: 0
ABDOMINAL PAIN: 0
NAUSEA: 0

## 2021-11-11 ASSESSMENT — ANXIETY QUESTIONNAIRES
5. BEING SO RESTLESS THAT IT IS HARD TO SIT STILL: NOT AT ALL
2. NOT BEING ABLE TO STOP OR CONTROL WORRYING: SEVERAL DAYS
7. FEELING AFRAID AS IF SOMETHING AWFUL MIGHT HAPPEN: SEVERAL DAYS
3. WORRYING TOO MUCH ABOUT DIFFERENT THINGS: NOT AT ALL
4. TROUBLE RELAXING: SEVERAL DAYS
6. BECOMING EASILY ANNOYED OR IRRITABLE: NOT AT ALL
GAD7 TOTAL SCORE: 4
1. FEELING NERVOUS, ANXIOUS, OR ON EDGE: SEVERAL DAYS

## 2021-11-11 NOTE — ASSESSMENT & PLAN NOTE
Patient reports that she has had a lump to the right lateral aspect of her scalp for many years and is unsure of what it is.  Patient denies any sudden changes in growth.  Reports that she is concerned only due to her grandfather's past medical history of skin cancer.  She is interested in a referral to dermatology for future skin checks.

## 2021-11-11 NOTE — PROGRESS NOTES
Kyleigh Adams is a pleasant 26 y.o. female here to establish care.    HPI:   Dermoid cyst of scalp  Patient reports that she has had a lump to the right lateral aspect of her scalp for many years and is unsure of what it is.  Patient denies any sudden changes in growth.  Reports that she is concerned only due to her grandfather's past medical history of skin cancer.  She is interested in a referral to dermatology for future skin checks.    Anxiety  Patient reports that she currently takes Zoloft 50 mg daily to help control her anxiety and her depression.  She did go off the Zoloft for short period when she ran out of medication several months ago.  She states at that time she noticed a worsening of her symptoms and has since then decided to stay on Zoloft for the time being as its helping her mood.    Major depressive disorder  Patient particularly take Celexa 50 mg daily to help control her depression.  Patient states that her and her  are actively trying to get pregnant, she is concerned about postpartum depression.  Patient is not established with a psychologist at this time.      Health Maintenance  Immunization: Patient is due for her third dose of her HPV, influenza, and tetanus vaccine. She would like to get all of these updated today.    Current medicines (including changes today)  Current Outpatient Medications   Medication Sig Dispense Refill   • sertraline (ZOLOFT) 50 MG Tab Take 1 Tablet by mouth every day. 90 Tablet 3   • Prenatal Vit-Fe Fumarate-FA (PRENATAL PO) Take  by mouth.       No current facility-administered medications for this visit.       Past Medical/ Surgical History  She  has a past medical history of Anxiety, Chronic fatigue syndrome, and Depression.  She  has a past surgical history that includes dental extraction(s).    Social History  Social History     Tobacco Use   • Smoking status: Never Smoker   • Smokeless tobacco: Never Used   Vaping Use   • Vaping Use: Never used  "  Substance Use Topics   • Alcohol use: Yes     Comment: 1 to 2 times a month   • Drug use: Never     Social History     Social History Narrative   • Not on file        Family History  Family History   Problem Relation Age of Onset   • Thyroid Mother    • Hypertension Father    • Thyroid Father    • Cancer Maternal Grandfather         skin   • Cancer Paternal Grandfather         non-hod lymphoma     Family Status   Relation Name Status   • Mo  Alive   • Fa  Alive   • MGFa     • PGFa           Review of Systems   Constitutional: Negative for chills, fever, malaise/fatigue and weight loss.   HENT: Negative for hearing loss and sore throat.    Eyes: Negative for blurred vision and double vision.   Respiratory: Negative for cough and shortness of breath.    Cardiovascular: Negative for chest pain, palpitations and leg swelling.   Gastrointestinal: Negative for abdominal pain, constipation, diarrhea, nausea and vomiting.   Musculoskeletal: Negative for myalgias.   Skin: Negative for rash.        Lump under the scalp right lateral side of her head   Neurological: Negative for dizziness, tingling and weakness.   Endo/Heme/Allergies: Does not bruise/bleed easily.   Psychiatric/Behavioral: Negative for depression. The patient is not nervous/anxious.          Objective:     /68 (BP Location: Right arm, Patient Position: Sitting, BP Cuff Size: Adult)   Pulse (!) 101   Temp 36.7 °C (98.1 °F) (Temporal)   Ht 1.626 m (5' 4\")   Wt 71.9 kg (158 lb 8.2 oz)   SpO2 97%  Body mass index is 27.21 kg/m².    Physical Exam  Constitutional:       General: She is not in acute distress.  HENT:      Head: Normocephalic and atraumatic.      Right Ear: External ear normal.      Left Ear: External ear normal.   Eyes:      Conjunctiva/sclera: Conjunctivae normal.      Pupils: Pupils are equal, round, and reactive to light.   Cardiovascular:      Rate and Rhythm: Normal rate and regular rhythm.      Heart sounds: No " murmur heard.  No friction rub. No gallop.    Pulmonary:      Effort: Pulmonary effort is normal.      Breath sounds: Normal breath sounds. No wheezing or rales.   Abdominal:      General: Bowel sounds are normal.      Palpations: Abdomen is soft.      Tenderness: There is no abdominal tenderness.   Musculoskeletal:      Cervical back: Normal range of motion and neck supple.   Skin:     General: Skin is warm and dry.      Findings: No rash.      Comments: 0.5 cm subdermal cyst noted to the right lateral aspect of the patient's scalp. No erythema, induration, or drainage.   Neurological:      Mental Status: She is alert and oriented to person, place, and time.      Gait: Gait is intact.   Psychiatric:         Mood and Affect: Affect normal.        Imaging:  No recent imaging to review    Labs:  Most recent labs from 03/04/2021 reviewed with patient  Assessment and Plan:   The following treatment plan was discussed    1. Moderate episode of recurrent major depressive disorder (HCC)  2. Anxiety  Chronic, stable. Recommend the patient continue with her Zoloft at 50 mg daily to help control both her anxiety and her depression. Did make mention that she had some insomnia issues but she does not attribute her anxiety to this. I did recommend setting herself up with a sleep schedule, and the bed is only to be used for sleep and sex. I also recommended avoiding any screen time an hour before bedtime, yoga or meditation prior to bed, or sleepy teas to help her sleep. I did recommend the patient establish herself with psychology in the event that she does start to experience postpartum depression and a psychologist be beneficial for the patient.  - sertraline (ZOLOFT) 50 MG Tab; Take 1 Tablet by mouth every day.  Dispense: 90 Tablet; Refill: 3  - Referral to Psychology    3. Dermoid cyst of scalp  Chronic, stable. I discussed with the patient that this is a benign finding, nothing to be concerned about. I did inform the  patient that if she wants to get it removed I can go ahead and send her to a dermatologist for removal. Patient was not interested in dermoid cyst removal at this time.    4. Screening for skin cancer  I did inform the patient that as she is concerned about skin cancer based on her experience with her grandfather, I recommend getting herself established with a dermatologist for routine skin checks.  - Referral to Dermatology    5. Immunization due  Patient is due for her third dose of her HPV vaccine, influenza, and tetanus shot today. We will going get her updated with HPV as well as the influenza shot. I did inform the patient that in her third trimester pregnancy they do recommend a tetanus vaccine to pass antibodies off to the fetus. We will go ahead and wait on her tetanus at this time.  - Gardasil 9  - INFLUENZA VACCINE QUAD INJ (PF)    6. Dyslipidemia  Chronic. I did recommend for the patient to you maintain a healthy lifestyle, watch red meats, animal fats such as cheese, milk, and ice cream. I did recommend healthy fats such as avocados.    Records requested.  Followup: Return in about 1 year (around 11/11/2022), or if symptoms worsen or fail to improve, for annual wellness.    I have placed vaccination orders.  The MA is preforming vaccination orders under the direction of Dr. Gould    Please note that this dictation was created using voice recognition software. I have made every reasonable attempt to correct obvious errors, but I expect that there are errors of grammar and possibly content that I did not discover before finalizing the note.

## 2021-11-11 NOTE — ASSESSMENT & PLAN NOTE
Patient particularly take Celexa 50 mg daily to help control her depression.  Patient states that her and her  are actively trying to get pregnant, she is concerned about postpartum depression.  Patient is not established with a psychologist at this time.

## 2021-11-11 NOTE — ASSESSMENT & PLAN NOTE
Patient reports that she currently takes Zoloft 50 mg daily to help control her anxiety and her depression.  She did go off the Zoloft for short period when she ran out of medication several months ago.  She states at that time she noticed a worsening of her symptoms and has since then decided to stay on Zoloft for the time being as its helping her mood.

## 2021-11-11 NOTE — ASSESSMENT & PLAN NOTE
Patient reports that she tries to maintain a relatively healthy diet with high fruits and vegetables, lean meats, whole grains.  She works out approximately 3 times a week has been trying to maintain healthy lifestyle.

## 2021-12-07 ENCOUNTER — PATIENT MESSAGE (OUTPATIENT)
Dept: OBGYN | Facility: CLINIC | Age: 26
End: 2021-12-07

## 2021-12-08 NOTE — TELEPHONE ENCOUNTER
Called the pt and pt states that her LMP was on 11/11/2021, pt informed that she is to early for a DUB visit currently but can schedule for 4-5 weeks out. Pt then transferred to PAR. No further questions.  ----- Message from Kyleigh Adams sent at 12/7/2021  2:02 PM PST -----  Regarding: Positive pregnancy test next steps  Hi! I have been trying to call the office to schedule an appointment because over the weekend I had a positive pregnancy test and I've missed my period. I'm wanting to get set up with an appointment for the next steps for my prenatal care. Please give me a call at [phone] or message me back on here. I understand that you are probably very busy, but any communication would be appreciated! Thank you!

## 2022-01-28 ENCOUNTER — GYNECOLOGY VISIT (OUTPATIENT)
Dept: OBGYN | Facility: CLINIC | Age: 27
End: 2022-01-28
Payer: COMMERCIAL

## 2022-01-28 VITALS — DIASTOLIC BLOOD PRESSURE: 66 MMHG | BODY MASS INDEX: 26.26 KG/M2 | WEIGHT: 153 LBS | SYSTOLIC BLOOD PRESSURE: 118 MMHG

## 2022-01-28 DIAGNOSIS — Z32.01 ENCOUNTER FOR PREGNANCY TEST, RESULT POSITIVE: ICD-10-CM

## 2022-01-28 DIAGNOSIS — N92.6 MISSED MENSES: ICD-10-CM

## 2022-01-28 PROCEDURE — 99213 OFFICE O/P EST LOW 20 MIN: CPT | Mod: 25 | Performed by: OBSTETRICS & GYNECOLOGY

## 2022-01-28 PROCEDURE — 76830 TRANSVAGINAL US NON-OB: CPT | Performed by: OBSTETRICS & GYNECOLOGY

## 2022-01-28 NOTE — PROGRESS NOTES
Cc: Confirmation of pregnancy    HPI:  The patient is a 26 y.o.  here for confirmation of pregnancy exam.  Patient is nauseated and previously she was having daily continuous nausea.  Over the past 1 week, she states the nausea has subsided somewhat.    Fetal movement denies   Vaginal bleeding denies    Leakage of fluid denies    Cramping denies   Nausea/vomiting: reports nausea but not much vomiting and this is subsiding  HA  denies   Dysuria  denies     Review of systems:  Pertinent positives documented in HPI and all other systems reviewed & are negative    PGYNHx:   Menarche at age 10.  LMP 2021.  Interval between cycles 28 days.  Length of flow 3 to 4 days.  Positive sexual activity with male partners, 3 lifetime partners, 1 current male partner.  History of contraception: Condoms, Ortho Tri-Cyclen pills, NuvaRing.  Last Pap smear 1 year ago  History of chlamydia which was treated years ago.    Pregnancy related complications:    OB History   No obstetric history on file.     Past Medical History:   Diagnosis Date   • Anxiety    • Chronic fatigue syndrome    • Depression    • Hyperlipidemia      Past Surgical History:   Procedure Laterality Date   • DENTAL EXTRACTION(S)      wisdom     Social History     Socioeconomic History   • Marital status:      Spouse name: Not on file   • Number of children: Not on file   • Years of education: Not on file   • Highest education level: Bachelor's degree (e.g., BA, AB, BS)   Occupational History   • Not on file   Tobacco Use   • Smoking status: Never Smoker   • Smokeless tobacco: Never Used   Vaping Use   • Vaping Use: Never used   Substance and Sexual Activity   • Alcohol use: Not Currently     Comment: 1 to 2 times a month   • Drug use: Never   • Sexual activity: Yes     Partners: Male     Birth control/protection: None     Comment:    Other Topics Concern   • Not on file   Social History Narrative   • Not on file     Social Determinants of  Health     Financial Resource Strain: Low Risk    • Difficulty of Paying Living Expenses: Not hard at all   Food Insecurity: No Food Insecurity   • Worried About Running Out of Food in the Last Year: Never true   • Ran Out of Food in the Last Year: Never true   Transportation Needs: No Transportation Needs   • Lack of Transportation (Medical): No   • Lack of Transportation (Non-Medical): No   Physical Activity: Insufficiently Active   • Days of Exercise per Week: 3 days   • Minutes of Exercise per Session: 30 min   Stress: No Stress Concern Present   • Feeling of Stress : Only a little   Social Connections: Moderately Isolated   • Frequency of Communication with Friends and Family: More than three times a week   • Frequency of Social Gatherings with Friends and Family: Three times a week   • Attends [employer] Services: Never   • Active Member of Clubs or Organizations: No   • Attends Club or Organization Meetings: Never   • Marital Status:    Intimate Partner Violence:    • Fear of Current or Ex-Partner: Not on file   • Emotionally Abused: Not on file   • Physically Abused: Not on file   • Sexually Abused: Not on file   Housing Stability: Low Risk    • Unable to Pay for Housing in the Last Year: No   • Number of Places Lived in the Last Year: 1   • Unstable Housing in the Last Year: No     Family History   Problem Relation Age of Onset   • Thyroid Mother    • Hypertension Father    • Thyroid Father    • Cancer Maternal Grandfather         skin   • Cancer Paternal Grandfather         non-hod lymphoma     Allergies:   Allergies as of 01/28/2022 - Reviewed 01/28/2022   Allergen Reaction Noted   • Miralax [polyethylene glycol] Runny Nose and Itching 05/11/2021       PE:    /66 (BP Location: Right arm, Patient Position: Sitting)   Wt 69.4 kg (153 lb)       General:appears stated age, is in no apparent distress  Head: normocephalic, non-tender  Neck: neck is supple, no jugular venous distension  Abdomen:  Bowel sounds positive, nondistended, soft, nontender x4, no rebound or guarding. No organomegaly. No masses.  Female GYN: normal female external genitalia without lesions     Skin: No rashes, or ulcers or lesions seen  Psychiatric: Patient shows appropriate affect, is alert and oriented x3, intact judgment and insight.    transvaginal scan and per my read:    Indication: missed menses, positive pregnancy test.   LMP 2021 making her LUIS 2022    Findings: mari intrauterine pregnancy @11-5 weeks by CRL. Positive yolk sac. Positive fetal cardiac activity @164 BPM. Right ovary simple cyst 2.25 x 3.16cm. Left Ovary not visualized. Cervical length 3.53cm.   No free fluid in the cul-de-sac.    Impression: viable IUP @ 11-5 weeks. EDC by US of 2022    DATIN2022 by LMP c/w todays US as per ACOG guidelines.    A/P:   1. Missed menses     2. Encounter for pregnancy test, result positive         # Newly diagnosed pregnancy  Screening options for Down Syndrome and neural tube defects discussed.  Patient desires both Horizon carrier screening as well as Latera panorama screening.  Both kits were given to the patient and she was given instructions about [facility] clinical.  Also discussed that in the second trimester she would have an MSAFP.  Practice model discussed with the patient today  Diet and healthy nutrition discussed with the patient  Questions answered  SAB precautions discussed.    Spent 30 minutes in face-to-face patient contact in which greater than 50% of that visit was spent in counseling/coordination of care of newly diagnosed pregnancy including medical and surgical options of care.    F/u in 4 weeks for new OB visit

## 2022-02-01 ENCOUNTER — OFFICE VISIT (OUTPATIENT)
Dept: DERMATOLOGY | Facility: IMAGING CENTER | Age: 27
End: 2022-02-01
Payer: COMMERCIAL

## 2022-02-01 VITALS — WEIGHT: 153 LBS | TEMPERATURE: 98.3 F | HEIGHT: 64 IN | BODY MASS INDEX: 26.12 KG/M2

## 2022-02-01 DIAGNOSIS — D23.9 INTRADERMAL NEVUS: ICD-10-CM

## 2022-02-01 DIAGNOSIS — D23.71 DERMATOFIBROMA OF LOWER LEG, RIGHT: ICD-10-CM

## 2022-02-01 DIAGNOSIS — L72.0 EPIDERMAL CYST: ICD-10-CM

## 2022-02-01 DIAGNOSIS — Z12.83 SKIN CANCER SCREENING: ICD-10-CM

## 2022-02-01 DIAGNOSIS — D22.9 MULTIPLE NEVI: ICD-10-CM

## 2022-02-01 PROCEDURE — 99213 OFFICE O/P EST LOW 20 MIN: CPT | Performed by: NURSE PRACTITIONER

## 2022-02-01 NOTE — PROGRESS NOTES
"DERMATOLOGY NOTE  NEW VISIT       Chief complaint: Establish Care (MARIVEL)     HPI/location: RT shin, brownish tan macule  Time present: Age 13, 2009  Painful lesion: No  Itching lesion: No  Enlarging lesion: No  Anything make it better or worse? No    History of skin cancer: No  History of precancers/actinic keratoses: No  History of biopsies:No  History of blistering/severe sunburns:Yes, Details: childhood  Family history of skin cancer:Yes, Details: MGF Melanoma  Family history of atypical moles:No      Allergies   Allergen Reactions   • Miralax [Polyethylene Glycol] Runny Nose and Itching     Itchy throat, and runny nose        MEDICATIONS:  Medications relevant to specialty reviewed.     REVIEW OF SYSTEMS:   Positive for skin (see HPI)  Negative for fevers and chills       EXAM:  Temp 36.8 °C (98.3 °F)   Ht 1.626 m (5' 4\")   Wt 69.4 kg (153 lb)   LMP 11/11/2021 (Exact Date)   BMI 26.26 kg/m²   Constitutional: Well-developed, well-nourished, and in no distress.     A total body skin exam was performed excluding the genitals per patient preference and including the following areas: head (including face), neck, chest, abdomen, groin/buttocks, back, bilateral upper extremities, and bilateral lower extremities with the following pertinent findings listed below and/or in assessment/plan.     Mobile, firm,rubbery, epidermal cyst RT scalp    Multiple tan medium brown macules scattered over the trunk >> extremities, including lower extremities and face, All with benign-appearing pigment network patterns on dermoscopy    5mm pilar cyst, R parietal region    IDN right axilla/upper extremity    DF RT pretibial region      IMPRESSION / PLAN:    1. Multiple nevi  - Benign-appearing nature of lesions discussed. Advised to return to clinic for any new or concerning changes.  - ABCDE's of melanoma discussed      2. Epidermal cyst  - Benign-appearing nature of lesions discussed. Advised to return to clinic for any new or " concerning changes.  Discuss treatment options, including excision    3. Dermatofibroma of lower leg, right  - Benign-appearing nature of lesions discussed. Advised to return to clinic for any new or concerning changes.      4. Intradermal nevus  - Benign-appearing nature of lesions discussed. Advised to return to clinic for any new or concerning changes.  - ABCDE's of melanoma discussed      5. Skin cancer screening  Skin cancer education  - discussed importance of sun protective clothing, eyewear  - discussed importance of daily use of broad spectrum sunscreen with SPF 30 or greater, as well as need for reapplication ~every 2 hours when exposed to UVR  - discussed importance of regular self-exams, ideally once per month, every 12 months exams in clinic  - ABCDE's of melanoma discussed  - patient to bring any new or concerning lesions to my attention  - Patient educational handout provided and reviewed with patient         Please note that this dictation was created using voice recognition software. I have made every reasonable attempt to correct obvious errors, but I expect that there are errors of grammar and possibly content that I did not discover before finalizing the note.      Return to clinic in: Return in about 1 year (around 2/1/2023) for MARIVEL. and as needed for any new or changing skin lesions.

## 2022-03-02 ENCOUNTER — INITIAL PRENATAL (OUTPATIENT)
Dept: OBGYN | Facility: CLINIC | Age: 27
End: 2022-03-02
Payer: COMMERCIAL

## 2022-03-02 ENCOUNTER — HOSPITAL ENCOUNTER (OUTPATIENT)
Facility: MEDICAL CENTER | Age: 27
End: 2022-03-02
Attending: OBSTETRICS & GYNECOLOGY
Payer: COMMERCIAL

## 2022-03-02 VITALS — SYSTOLIC BLOOD PRESSURE: 112 MMHG | WEIGHT: 155 LBS | BODY MASS INDEX: 26.61 KG/M2 | DIASTOLIC BLOOD PRESSURE: 67 MMHG

## 2022-03-02 DIAGNOSIS — Z14.8 CARRIER OF GENETIC DEFECT: ICD-10-CM

## 2022-03-02 DIAGNOSIS — Z34.00 SUPERVISION OF NORMAL FIRST PREGNANCY, ANTEPARTUM: ICD-10-CM

## 2022-03-02 DIAGNOSIS — O28.3 ABNORMAL FETAL ULTRASOUND: ICD-10-CM

## 2022-03-02 PROCEDURE — 59401 PR NEW OB VISIT: CPT | Performed by: OBSTETRICS & GYNECOLOGY

## 2022-03-02 PROCEDURE — 88175 CYTOPATH C/V AUTO FLUID REDO: CPT

## 2022-03-02 PROCEDURE — 87591 N.GONORRHOEAE DNA AMP PROB: CPT

## 2022-03-02 PROCEDURE — 87491 CHLMYD TRACH DNA AMP PROBE: CPT

## 2022-03-02 ASSESSMENT — EDINBURGH POSTNATAL DEPRESSION SCALE (EPDS)
I HAVE BLAMED MYSELF UNNECESSARILY WHEN THINGS WENT WRONG: YES, SOME OF THE TIME
I HAVE BEEN SO UNHAPPY THAT I HAVE HAD DIFFICULTY SLEEPING: NOT AT ALL
I HAVE BEEN ABLE TO LAUGH AND SEE THE FUNNY SIDE OF THINGS: AS MUCH AS I ALWAYS COULD
I HAVE FELT SCARED OR PANICKY FOR NO GOOD REASON: YES, SOMETIMES
THE THOUGHT OF HARMING MYSELF HAS OCCURRED TO ME: NEVER
I HAVE LOOKED FORWARD WITH ENJOYMENT TO THINGS: AS MUCH AS I EVER DID
I HAVE BEEN ANXIOUS OR WORRIED FOR NO GOOD REASON: HARDLY EVER
I HAVE FELT SAD OR MISERABLE: NO, NOT AT ALL
I HAVE BEEN SO UNHAPPY THAT I HAVE BEEN CRYING: NO, NEVER
THINGS HAVE BEEN GETTING ON TOP OF ME: NO, MOST OF THE TIME I HAVE COPED QUITE WELL
TOTAL SCORE: 6

## 2022-03-02 NOTE — PROGRESS NOTES
Cc: New OB visit    HPI:  The patient is a 26 y.o.  15w6d based upon LMP c/w early US  Patient's last menstrual period was 2021 (exact date).      She presents for her new obstetric visit.      ROS  She denies fetal movement, denies vaginal bleeding, denies leakage of fluid, denies contractions.     She denies nausea/vomiting, headaches, or urinary symptoms.      PGYNHx:   Menarche at age 10.  LMP 2021.  Interval between cycles 28 days.  Length of flow 3 to 4 days.  Positive sexual activity with male partners, 3 lifetime partners, 1 current male partner.  History of contraception: Condoms, Ortho Tri-Cyclen pills, NuvaRing.  Last Pap smear 1 year ago  History of chlamydia which was treated years ago.    Infection Prevention  1. High Risk For HIV: No 6. Rash Or Illness Since LMP: No   2. High Risk For Hepatitis B or C: No 7. History Of STD, GC, Chlamydia, HPV Syphilis: No   3. Live With Someone With TB Or Exposed To TB: No 8. Have a cat in the home?: Yes   4. Patient Or Partner Has A History Of Herpes: No 8a. Responsible for changing the litter?: Yes   5. History of Chicken Pox: No 9. Other (See Comments Below): No        Genetic Screening/Teratology Counseling- Includes patient, baby's father, or anyone in either family with:  Patient's age 35 years or older as of estimated date of delivery: No   Thalassemia (Italian, Greek, Mediterranean, or  background): MCV less than 80: No   Neural tube defect (Meningomyelocele, Spina bifida, or Anencephaly): No   Congenital heart defect: No   Down syndrome: No   Alexi-Sachs (Ashkenazi Moravian, Cajun, Arabic Italian): No   Canavan disease (Ashkenazi Moravian): No   Familial dysautonomia (Ashkenazi Moravian): No   Sickle cell disease or trait (): No   Hemophilia or other blood disorders: No   Muscular dystrophy: No    Cystic fibrosis: No   Gary's chorea: No   Mental retardation/autism: No   Other inherited genetic or chromosomal disorder: No   Maternal  metabolic disorder (eg. Type 1 diabetes, PKU): No   Patient or baby's father had child with birth defects not listed above: No   Recurrent pregnancy loss, or a stillbirth: No   Medications (including supplements, vitamins, herbs, or OTC drugs)/illicit/recreational drugs/alcohol since last menstrual period: No             OB History    Para Term  AB Living   1             SAB IAB Ectopic Molar Multiple Live Births                    # Outcome Date GA Lbr Gigi/2nd Weight Sex Delivery Anes PTL Lv   1 Current              Past Medical History:   Diagnosis Date   • Anxiety    • Chronic fatigue syndrome    • Depression    • Hyperlipidemia      Past Surgical History:   Procedure Laterality Date   • DENTAL EXTRACTION(S)      wisdom     Social History     Socioeconomic History   • Marital status:      Spouse name: Not on file   • Number of children: Not on file   • Years of education: Not on file   • Highest education level: Bachelor's degree (e.g., BA, AB, BS)   Occupational History   • Not on file   Tobacco Use   • Smoking status: Never Smoker   • Smokeless tobacco: Never Used   Vaping Use   • Vaping Use: Never used   Substance and Sexual Activity   • Alcohol use: Not Currently     Comment: 1 to 2 times a month   • Drug use: Never   • Sexual activity: Yes     Partners: Male     Birth control/protection: None     Comment:    Other Topics Concern   • Not on file   Social History Narrative   • Not on file     Social Determinants of Health     Financial Resource Strain: Low Risk    • Difficulty of Paying Living Expenses: Not hard at all   Food Insecurity: No Food Insecurity   • Worried About Running Out of Food in the Last Year: Never true   • Ran Out of Food in the Last Year: Never true   Transportation Needs: No Transportation Needs   • Lack of Transportation (Medical): No   • Lack of Transportation (Non-Medical): No   Physical Activity: Insufficiently Active   • Days of Exercise per Week: 3 days    • Minutes of Exercise per Session: 30 min   Stress: No Stress Concern Present   • Feeling of Stress : Only a little   Social Connections: Moderately Isolated   • Frequency of Communication with Friends and Family: More than three times a week   • Frequency of Social Gatherings with Friends and Family: Three times a week   • Attends Anabaptist Services: Never   • Active Member of Clubs or Organizations: No   • Attends Club or Organization Meetings: Never   • Marital Status:    Intimate Partner Violence: Not on file   Housing Stability: Low Risk    • Unable to Pay for Housing in the Last Year: No   • Number of Places Lived in the Last Year: 1   • Unstable Housing in the Last Year: No     Family History   Problem Relation Age of Onset   • Thyroid Mother    • Hypertension Father    • Thyroid Father    • Cancer Maternal Grandfather         skin   • Cancer Paternal Grandfather         non-hod lymphoma     Allergies:   Allergies as of 2022 - Reviewed 2022   Allergen Reaction Noted   • Miralax [polyethylene glycol] Runny Nose and Itching 2021       PE:    /67   Wt 70.3 kg (155 lb)     General: no apparent distress, is well developed and well nourished  Head: normocephalic, atraumatic  Neck: neck is supple, non-tender, no thyromegaly, full range of motion  CVS: regular rate and rhythm, no peripheral edema  Lungs: Normal respiratory effort. Clear to auscultation bilaterally  Abdomen: Bowel sounds positive, nondistended, soft, nontender x4, no rebound or guarding.  Female GYN: normal female external genitalia without lesions    Uterus is enlarged at 16 weeks.  Skin: No rashes, or ulcers or lesions seen  Psychiatric: Patient shows appropriate affect, is alert and oriented x3, intact judgment and insight.    A/P:  26 y.o.  15w6d based upon LMP .  She is here for her new obstetric visit.      1. Supervision of normal first pregnancy, antepartum  PRENATAL PANEL 3+HIV+HCV    HEP C VIRUS ANTIBODY     US-OB 2ND 3RD TRI COMPLETE    URINE DRUG SCREEN W/CONF (AR)    THINPREP RFLX HPV ASCUS W/CTNG    URINALYSIS,CULTURE IF INDICATED     #Positive carrier screening for SLO  - FOB given Horizon screening kit from [employer] and instructed on where to go. If positive then will send to [facility]    - Ultrasound for dates and anatomy Rx today.  - New prenatal labs given today.  - NOB packet given with ACOG prenatal book.  - Increase water intake and encouraged healthy nutrition. Encouraged moderate exercise may continue into final trimester.   - Continue prenatal vitamins.  - Follow-up in 4 weeks.   - SAB precautions educated.  - Oriented to practice model and number of expected visits in the future.  - Advised to get flu vaccine during flu season

## 2022-03-02 NOTE — NON-PROVIDER
NOB today  LMP: 11/11/2021  Last pap: today  Phone # [phone] (home)   Pharmacy confirmed  On PNV  Cystic Fibrosis test offered.  C/o none

## 2022-03-03 DIAGNOSIS — Z34.00 SUPERVISION OF NORMAL FIRST PREGNANCY, ANTEPARTUM: ICD-10-CM

## 2022-03-05 LAB
C TRACH DNA GENITAL QL NAA+PROBE: NEGATIVE
CYTOLOGY REG CYTOL: NORMAL
N GONORRHOEA DNA GENITAL QL NAA+PROBE: NEGATIVE
SPECIMEN SOURCE: NORMAL

## 2022-03-08 ENCOUNTER — TELEPHONE (OUTPATIENT)
Dept: OBGYN | Facility: CLINIC | Age: 27
End: 2022-03-08
Payer: COMMERCIAL

## 2022-03-09 ENCOUNTER — HOSPITAL ENCOUNTER (OUTPATIENT)
Dept: LAB | Facility: MEDICAL CENTER | Age: 27
End: 2022-03-09
Attending: OBSTETRICS & GYNECOLOGY
Payer: COMMERCIAL

## 2022-03-09 DIAGNOSIS — Z34.00 SUPERVISION OF NORMAL FIRST PREGNANCY, ANTEPARTUM: ICD-10-CM

## 2022-03-09 LAB
ABO GROUP BLD: NORMAL
APPEARANCE UR: CLEAR
BILIRUB UR QL STRIP.AUTO: NEGATIVE
BLD GP AB SCN SERPL QL: NORMAL
COLOR UR: YELLOW
GLUCOSE UR STRIP.AUTO-MCNC: NEGATIVE MG/DL
KETONES UR STRIP.AUTO-MCNC: 15 MG/DL
LEUKOCYTE ESTERASE UR QL STRIP.AUTO: NEGATIVE
MICRO URNS: ABNORMAL
NITRITE UR QL STRIP.AUTO: NEGATIVE
PH UR STRIP.AUTO: 6 [PH] (ref 5–8)
PROT UR QL STRIP: NEGATIVE MG/DL
RBC UR QL AUTO: NEGATIVE
RH BLD: NORMAL
SP GR UR STRIP.AUTO: 1.02

## 2022-03-09 PROCEDURE — 87340 HEPATITIS B SURFACE AG IA: CPT

## 2022-03-09 PROCEDURE — 86780 TREPONEMA PALLIDUM: CPT

## 2022-03-09 PROCEDURE — 86762 RUBELLA ANTIBODY: CPT

## 2022-03-09 PROCEDURE — 86901 BLOOD TYPING SEROLOGIC RH(D): CPT

## 2022-03-09 PROCEDURE — 82105 ALPHA-FETOPROTEIN SERUM: CPT

## 2022-03-09 PROCEDURE — 86803 HEPATITIS C AB TEST: CPT

## 2022-03-09 PROCEDURE — 86850 RBC ANTIBODY SCREEN: CPT

## 2022-03-09 PROCEDURE — 86592 SYPHILIS TEST NON-TREP QUAL: CPT

## 2022-03-09 PROCEDURE — 81003 URINALYSIS AUTO W/O SCOPE: CPT

## 2022-03-09 PROCEDURE — 36415 COLL VENOUS BLD VENIPUNCTURE: CPT

## 2022-03-09 PROCEDURE — 87389 HIV-1 AG W/HIV-1&-2 AB AG IA: CPT

## 2022-03-09 PROCEDURE — 85025 COMPLETE CBC W/AUTO DIFF WBC: CPT

## 2022-03-09 PROCEDURE — 80307 DRUG TEST PRSMV CHEM ANLYZR: CPT

## 2022-03-09 PROCEDURE — 86900 BLOOD TYPING SEROLOGIC ABO: CPT

## 2022-03-10 LAB
BASOPHILS # BLD AUTO: 0.3 % (ref 0–1.8)
BASOPHILS # BLD: 0.03 K/UL (ref 0–0.12)
EOSINOPHIL # BLD AUTO: 0.12 K/UL (ref 0–0.51)
EOSINOPHIL NFR BLD: 1.2 % (ref 0–6.9)
ERYTHROCYTE [DISTWIDTH] IN BLOOD BY AUTOMATED COUNT: 42.7 FL (ref 35.9–50)
HBV SURFACE AG SER QL: ABNORMAL
HCT VFR BLD AUTO: 36.6 % (ref 37–47)
HCV AB SER QL: ABNORMAL
HCV AB SER QL: NORMAL
HGB BLD-MCNC: 12.6 G/DL (ref 12–16)
HIV 1+2 AB+HIV1 P24 AG SERPL QL IA: NORMAL
IMM GRANULOCYTES # BLD AUTO: 0.05 K/UL (ref 0–0.11)
IMM GRANULOCYTES NFR BLD AUTO: 0.5 % (ref 0–0.9)
LYMPHOCYTES # BLD AUTO: 2.5 K/UL (ref 1–4.8)
LYMPHOCYTES NFR BLD: 25.7 % (ref 22–41)
MCH RBC QN AUTO: 31.9 PG (ref 27–33)
MCHC RBC AUTO-ENTMCNC: 34.4 G/DL (ref 33.6–35)
MCV RBC AUTO: 92.7 FL (ref 81.4–97.8)
MONOCYTES # BLD AUTO: 0.68 K/UL (ref 0–0.85)
MONOCYTES NFR BLD AUTO: 7 % (ref 0–13.4)
NEUTROPHILS # BLD AUTO: 6.34 K/UL (ref 2–7.15)
NEUTROPHILS NFR BLD: 65.3 % (ref 44–72)
NRBC # BLD AUTO: 0 K/UL
NRBC BLD-RTO: 0 /100 WBC
PLATELET # BLD AUTO: 274 K/UL (ref 164–446)
PMV BLD AUTO: 10 FL (ref 9–12.9)
RBC # BLD AUTO: 3.95 M/UL (ref 4.2–5.4)
RUBV AB SER QL: 416 IU/ML
T PALLIDUM AB SER QL IA: ABNORMAL
WBC # BLD AUTO: 9.7 K/UL (ref 4.8–10.8)

## 2022-03-11 LAB
AMPHET CTO UR CFM-MCNC: NEGATIVE NG/ML
BARBITURATES CTO UR CFM-MCNC: NEGATIVE NG/ML
BENZODIAZ CTO UR CFM-MCNC: NEGATIVE NG/ML
CANNABINOIDS CTO UR CFM-MCNC: NEGATIVE NG/ML
COCAINE CTO UR CFM-MCNC: NEGATIVE NG/ML
DRUG COMMENT 753798: NORMAL
METHADONE CTO UR CFM-MCNC: NEGATIVE NG/ML
OPIATES CTO UR CFM-MCNC: NEGATIVE NG/ML
PCP CTO UR CFM-MCNC: NEGATIVE NG/ML
PROPOXYPH CTO UR CFM-MCNC: NEGATIVE NG/ML

## 2022-03-14 LAB
# FETUSES US: NORMAL
AFP MOM SERPL: 0.93
AFP SERPL-MCNC: 35 NG/ML
AGE - REPORTED: 27.9 YR
CURRENT SMOKER: NO
FAMILY MEMBER DISEASES HX: NO
GA METHOD: NORMAL
GA: NORMAL WK
IDDM PATIENT QL: NO
INTEGRATED SCN PATIENT-IMP: NORMAL
SPECIMEN DRAWN SERPL: NORMAL

## 2022-03-31 ENCOUNTER — HOSPITAL ENCOUNTER (OUTPATIENT)
Dept: RADIOLOGY | Facility: MEDICAL CENTER | Age: 27
End: 2022-03-31
Attending: OBSTETRICS & GYNECOLOGY
Payer: COMMERCIAL

## 2022-03-31 DIAGNOSIS — Z34.00 SUPERVISION OF NORMAL FIRST PREGNANCY, ANTEPARTUM: ICD-10-CM

## 2022-03-31 PROCEDURE — 76805 OB US >/= 14 WKS SNGL FETUS: CPT

## 2022-04-01 ENCOUNTER — TELEPHONE (OUTPATIENT)
Dept: OBGYN | Facility: CLINIC | Age: 27
End: 2022-04-01
Payer: COMMERCIAL

## 2022-04-01 DIAGNOSIS — O28.3 ABNORMAL PREGNANCY US: ICD-10-CM

## 2022-04-01 NOTE — TELEPHONE ENCOUNTER
Called patient to discuss ultrasound results.  Advised of potential abnormality with aorta and also potential for duodenal atresia.  We discussed various scenarios for all of these abnormalities.  Patient was referred to high [facility] pregnancy center today under an urgent referral.  Advised her that usually referrals are processed same day that are marked as urgent and that she would hear from their office next week.  Questions answered

## 2022-04-11 ENCOUNTER — ROUTINE PRENATAL (OUTPATIENT)
Dept: OBGYN | Facility: CLINIC | Age: 27
End: 2022-04-11
Payer: COMMERCIAL

## 2022-04-11 VITALS — BODY MASS INDEX: 28.84 KG/M2 | DIASTOLIC BLOOD PRESSURE: 72 MMHG | WEIGHT: 168 LBS | SYSTOLIC BLOOD PRESSURE: 115 MMHG

## 2022-04-11 DIAGNOSIS — Z34.00 SUPERVISION OF NORMAL FIRST PREGNANCY, ANTEPARTUM: ICD-10-CM

## 2022-04-11 PROCEDURE — 0502F SUBSEQUENT PRENATAL CARE: CPT | Performed by: NURSE PRACTITIONER

## 2022-04-11 ASSESSMENT — FIBROSIS 4 INDEX: FIB4 SCORE: 0.56

## 2022-04-11 NOTE — PROGRESS NOTES
Pt presents for ob check @ 21.4. Doing well having active FM and she has a follow-u with [facility] due to Abnormal ultrasound at [facility] on 3/31/22.

## 2022-04-11 NOTE — PROGRESS NOTES
SUBJECTIVE:  Pt is a 26 y.o.   at 21w4d  gestation. Presents today for follow-up prenatal care. Reports no issues at this time.  Reports fetal movement. Denies regular cramping/contractions, bleeding or leaking of fluid. Denies dysuria, headaches, N/V. Generally feels well today except questions about the US.  Reports her moods are controlled well with Zoloft.     OBJECTIVE:  - See prenatal vitals flow  -   Vitals:    22 0747   BP: 115/72   Weight: 76.2 kg (168 lb)                 ASSESSMENT:   - IUP at 21w4d    - S=D   -   Patient Active Problem List    Diagnosis Date Noted   • [facility]   • Supervision of normal first pregnancy, antepartum 2022   • carrier of SLO 2022   • Anxiety 2021   • Dermoid cyst of scalp 2021   • Dyslipidemia 2021   • Major depressive disorder 2020         PLAN:  - S/sx pregnancy and labor warning signs vs general discomforts discussed  - Fetal movements and/or kick counts reviewed   - Adequate hydration reinforced  - Nutrition/exercise/vitamin education; continue PNV  - Reviewed COVID-19 vaccination recommendations: pt reports  she got her covid booster  - Has appt with [facility] on   - Anticipatory guidance given  - RTC in 4 weeks for follow-up prenatal care

## 2022-04-20 ENCOUNTER — TELEPHONE (OUTPATIENT)
Dept: OBGYN | Facility: CLINIC | Age: 27
End: 2022-04-20
Payer: COMMERCIAL

## 2022-04-20 NOTE — TELEPHONE ENCOUNTER
Loly - genetic counselor called requesting NIPT results and Horizon results for both patient and . Results faxed

## 2022-05-17 ENCOUNTER — ROUTINE PRENATAL (OUTPATIENT)
Dept: OBGYN | Facility: CLINIC | Age: 27
End: 2022-05-17
Payer: COMMERCIAL

## 2022-05-17 VITALS — DIASTOLIC BLOOD PRESSURE: 67 MMHG | SYSTOLIC BLOOD PRESSURE: 114 MMHG | BODY MASS INDEX: 28.32 KG/M2 | WEIGHT: 165 LBS

## 2022-05-17 DIAGNOSIS — Z34.00 SUPERVISION OF NORMAL FIRST PREGNANCY, ANTEPARTUM: ICD-10-CM

## 2022-05-17 PROCEDURE — 0502F SUBSEQUENT PRENATAL CARE: CPT | Performed by: OBSTETRICS & GYNECOLOGY

## 2022-05-17 ASSESSMENT — FIBROSIS 4 INDEX: FIB4 SCORE: 0.56

## 2022-05-20 ENCOUNTER — HOSPITAL ENCOUNTER (OUTPATIENT)
Dept: LAB | Facility: MEDICAL CENTER | Age: 27
End: 2022-05-20
Attending: OBSTETRICS & GYNECOLOGY
Payer: COMMERCIAL

## 2022-05-20 DIAGNOSIS — Z34.00 SUPERVISION OF NORMAL FIRST PREGNANCY, ANTEPARTUM: ICD-10-CM

## 2022-05-20 LAB
BASOPHILS # BLD AUTO: 0.5 % (ref 0–1.8)
BASOPHILS # BLD: 0.05 K/UL (ref 0–0.12)
EOSINOPHIL # BLD AUTO: 0.1 K/UL (ref 0–0.51)
EOSINOPHIL NFR BLD: 0.9 % (ref 0–6.9)
ERYTHROCYTE [DISTWIDTH] IN BLOOD BY AUTOMATED COUNT: 45.6 FL (ref 35.9–50)
GLUCOSE 1H P 50 G GLC PO SERPL-MCNC: 83 MG/DL (ref 70–139)
HCT VFR BLD AUTO: 40.3 % (ref 37–47)
HGB BLD-MCNC: 13.1 G/DL (ref 12–16)
IMM GRANULOCYTES # BLD AUTO: 0.12 K/UL (ref 0–0.11)
IMM GRANULOCYTES NFR BLD AUTO: 1.1 % (ref 0–0.9)
LYMPHOCYTES # BLD AUTO: 1.86 K/UL (ref 1–4.8)
LYMPHOCYTES NFR BLD: 17.1 % (ref 22–41)
MCH RBC QN AUTO: 32.4 PG (ref 27–33)
MCHC RBC AUTO-ENTMCNC: 32.5 G/DL (ref 33.6–35)
MCV RBC AUTO: 99.8 FL (ref 81.4–97.8)
MONOCYTES # BLD AUTO: 0.79 K/UL (ref 0–0.85)
MONOCYTES NFR BLD AUTO: 7.3 % (ref 0–13.4)
NEUTROPHILS # BLD AUTO: 7.97 K/UL (ref 2–7.15)
NEUTROPHILS NFR BLD: 73.1 % (ref 44–72)
NRBC # BLD AUTO: 0 K/UL
NRBC BLD-RTO: 0 /100 WBC
PLATELET # BLD AUTO: 285 K/UL (ref 164–446)
PMV BLD AUTO: 10.1 FL (ref 9–12.9)
RBC # BLD AUTO: 4.04 M/UL (ref 4.2–5.4)
T PALLIDUM AB SER QL IA: NORMAL
WBC # BLD AUTO: 10.9 K/UL (ref 4.8–10.8)

## 2022-05-20 PROCEDURE — 85025 COMPLETE CBC W/AUTO DIFF WBC: CPT

## 2022-05-20 PROCEDURE — 36415 COLL VENOUS BLD VENIPUNCTURE: CPT

## 2022-05-20 PROCEDURE — 86780 TREPONEMA PALLIDUM: CPT

## 2022-05-20 PROCEDURE — 82950 GLUCOSE TEST: CPT

## 2022-05-27 ENCOUNTER — TELEPHONE (OUTPATIENT)
Dept: OBGYN | Facility: CLINIC | Age: 27
End: 2022-05-27
Payer: COMMERCIAL

## 2022-06-03 ENCOUNTER — APPOINTMENT (OUTPATIENT)
Dept: OBGYN | Facility: CLINIC | Age: 27
End: 2022-06-03
Payer: COMMERCIAL

## 2022-06-06 ENCOUNTER — NON-PROVIDER VISIT (OUTPATIENT)
Dept: OBGYN | Facility: CLINIC | Age: 27
End: 2022-06-06
Payer: COMMERCIAL

## 2022-06-06 DIAGNOSIS — Z34.03 SUPERVISION OF NORMAL FIRST PREGNANCY IN THIRD TRIMESTER: ICD-10-CM

## 2022-06-06 PROCEDURE — 90471 IMMUNIZATION ADMIN: CPT | Performed by: ADVANCED PRACTICE MIDWIFE

## 2022-06-06 PROCEDURE — 90715 TDAP VACCINE 7 YRS/> IM: CPT | Performed by: ADVANCED PRACTICE MIDWIFE

## 2022-06-20 ENCOUNTER — ROUTINE PRENATAL (OUTPATIENT)
Dept: OBGYN | Facility: CLINIC | Age: 27
End: 2022-06-20
Payer: COMMERCIAL

## 2022-06-20 VITALS — SYSTOLIC BLOOD PRESSURE: 113 MMHG | BODY MASS INDEX: 29.01 KG/M2 | DIASTOLIC BLOOD PRESSURE: 81 MMHG | WEIGHT: 169 LBS

## 2022-06-20 DIAGNOSIS — O28.3 ABNORMAL PREGNANCY US: ICD-10-CM

## 2022-06-20 DIAGNOSIS — Z14.8 CARRIER OF GENETIC DEFECT: ICD-10-CM

## 2022-06-20 PROCEDURE — 0502F SUBSEQUENT PRENATAL CARE: CPT | Performed by: OBSTETRICS & GYNECOLOGY

## 2022-06-20 ASSESSMENT — FIBROSIS 4 INDEX: FIB4 SCORE: 0.54

## 2022-06-20 NOTE — PROGRESS NOTES
Patient desires unmedicated delivery for as long as possible.  Discussed that labor and delivery are supportive of this.  Also discussed the possibility of intermittent monitoring if baby's heart rate is normal as well as requirement of having an IV established all of which she is okay with.

## 2022-07-07 ENCOUNTER — ROUTINE PRENATAL (OUTPATIENT)
Dept: OBGYN | Facility: CLINIC | Age: 27
End: 2022-07-07
Payer: COMMERCIAL

## 2022-07-07 VITALS — WEIGHT: 173 LBS | BODY MASS INDEX: 29.7 KG/M2 | SYSTOLIC BLOOD PRESSURE: 135 MMHG | DIASTOLIC BLOOD PRESSURE: 86 MMHG

## 2022-07-07 DIAGNOSIS — Z34.00 SUPERVISION OF NORMAL FIRST PREGNANCY, ANTEPARTUM: ICD-10-CM

## 2022-07-07 PROCEDURE — 0502F SUBSEQUENT PRENATAL CARE: CPT | Performed by: OBSTETRICS & GYNECOLOGY

## 2022-07-07 ASSESSMENT — FIBROSIS 4 INDEX: FIB4 SCORE: 0.54

## 2022-07-07 NOTE — PROGRESS NOTES
Pt doing well, no signs of PTL. Having some rib pain, feels like a bruise-only hurts when she touches it.   -PTL and FKC reviewed  -GBS after 36 weeks  -RTC in 1 week

## 2022-07-13 DIAGNOSIS — F33.1 MODERATE EPISODE OF RECURRENT MAJOR DEPRESSIVE DISORDER (HCC): ICD-10-CM

## 2022-07-13 DIAGNOSIS — F41.9 ANXIETY: ICD-10-CM

## 2022-07-18 ENCOUNTER — ROUTINE PRENATAL (OUTPATIENT)
Dept: OBGYN | Facility: CLINIC | Age: 27
End: 2022-07-18
Payer: COMMERCIAL

## 2022-07-18 VITALS — DIASTOLIC BLOOD PRESSURE: 66 MMHG | BODY MASS INDEX: 30.21 KG/M2 | WEIGHT: 176 LBS | SYSTOLIC BLOOD PRESSURE: 107 MMHG

## 2022-07-18 DIAGNOSIS — Z34.00 SUPERVISION OF NORMAL FIRST PREGNANCY, ANTEPARTUM: ICD-10-CM

## 2022-07-18 PROCEDURE — 0502F SUBSEQUENT PRENATAL CARE: CPT | Performed by: OBSTETRICS & GYNECOLOGY

## 2022-07-18 ASSESSMENT — FIBROSIS 4 INDEX: FIB4 SCORE: 0.54

## 2022-07-18 NOTE — PROGRESS NOTES
Pt doing well, no complaints. Discussed vaccine recommendations for close contact with baby.   -GBS next appt  -PTL and FKC reviewed  -RTC in 1 week

## 2022-07-28 ENCOUNTER — ROUTINE PRENATAL (OUTPATIENT)
Dept: OBGYN | Facility: CLINIC | Age: 27
End: 2022-07-28
Payer: COMMERCIAL

## 2022-07-28 ENCOUNTER — HOSPITAL ENCOUNTER (OUTPATIENT)
Facility: MEDICAL CENTER | Age: 27
End: 2022-07-28
Attending: OBSTETRICS & GYNECOLOGY
Payer: COMMERCIAL

## 2022-07-28 VITALS — WEIGHT: 178 LBS | DIASTOLIC BLOOD PRESSURE: 74 MMHG | BODY MASS INDEX: 30.55 KG/M2 | SYSTOLIC BLOOD PRESSURE: 114 MMHG

## 2022-07-28 DIAGNOSIS — Z34.00 SUPERVISION OF NORMAL FIRST PREGNANCY, ANTEPARTUM: ICD-10-CM

## 2022-07-28 PROCEDURE — 87150 DNA/RNA AMPLIFIED PROBE: CPT

## 2022-07-28 PROCEDURE — 87081 CULTURE SCREEN ONLY: CPT

## 2022-07-28 PROCEDURE — 0502F SUBSEQUENT PRENATAL CARE: CPT | Performed by: OBSTETRICS & GYNECOLOGY

## 2022-07-28 ASSESSMENT — FIBROSIS 4 INDEX: FIB4 SCORE: 0.54

## 2022-07-30 LAB — GP B STREP DNA SPEC QL NAA+PROBE: NEGATIVE

## 2022-08-01 ENCOUNTER — ROUTINE PRENATAL (OUTPATIENT)
Dept: OBGYN | Facility: CLINIC | Age: 27
End: 2022-08-01
Payer: COMMERCIAL

## 2022-08-01 VITALS — SYSTOLIC BLOOD PRESSURE: 124 MMHG | WEIGHT: 177 LBS | BODY MASS INDEX: 30.38 KG/M2 | DIASTOLIC BLOOD PRESSURE: 87 MMHG

## 2022-08-01 DIAGNOSIS — Z34.00 SUPERVISION OF NORMAL FIRST PREGNANCY, ANTEPARTUM: ICD-10-CM

## 2022-08-01 PROCEDURE — 0502F SUBSEQUENT PRENATAL CARE: CPT | Performed by: OBSTETRICS & GYNECOLOGY

## 2022-08-01 ASSESSMENT — FIBROSIS 4 INDEX: FIB4 SCORE: 0.54

## 2022-08-01 NOTE — PROGRESS NOTES
Pt doing well, no complaints  -Labor precautions and FKC reviewed  -RTC in 1 week  -Will plan for SVE and consideration of date/timing of IOL. Pt agrees.

## 2022-08-11 ENCOUNTER — ROUTINE PRENATAL (OUTPATIENT)
Dept: OBGYN | Facility: CLINIC | Age: 27
End: 2022-08-11
Payer: COMMERCIAL

## 2022-08-11 VITALS — SYSTOLIC BLOOD PRESSURE: 117 MMHG | DIASTOLIC BLOOD PRESSURE: 79 MMHG | WEIGHT: 177 LBS | BODY MASS INDEX: 30.38 KG/M2

## 2022-08-11 DIAGNOSIS — Z34.00 SUPERVISION OF NORMAL FIRST PREGNANCY, ANTEPARTUM: ICD-10-CM

## 2022-08-11 PROCEDURE — 0502F SUBSEQUENT PRENATAL CARE: CPT | Performed by: OBSTETRICS & GYNECOLOGY

## 2022-08-11 ASSESSMENT — FIBROSIS 4 INDEX: FIB4 SCORE: 0.54

## 2022-08-15 ENCOUNTER — ANESTHESIA (OUTPATIENT)
Dept: OBGYN | Facility: MEDICAL CENTER | Age: 27
End: 2022-08-15
Payer: COMMERCIAL

## 2022-08-15 ENCOUNTER — ANESTHESIA EVENT (OUTPATIENT)
Dept: OBGYN | Facility: MEDICAL CENTER | Age: 27
End: 2022-08-15
Payer: COMMERCIAL

## 2022-08-15 ENCOUNTER — HOSPITAL ENCOUNTER (INPATIENT)
Facility: MEDICAL CENTER | Age: 27
LOS: 3 days | End: 2022-08-18
Attending: OBSTETRICS & GYNECOLOGY | Admitting: STUDENT IN AN ORGANIZED HEALTH CARE EDUCATION/TRAINING PROGRAM
Payer: COMMERCIAL

## 2022-08-15 LAB
BASOPHILS # BLD AUTO: 0.2 % (ref 0–1.8)
BASOPHILS # BLD: 0.04 K/UL (ref 0–0.12)
EOSINOPHIL # BLD AUTO: 0.02 K/UL (ref 0–0.51)
EOSINOPHIL NFR BLD: 0.1 % (ref 0–6.9)
ERYTHROCYTE [DISTWIDTH] IN BLOOD BY AUTOMATED COUNT: 44.3 FL (ref 35.9–50)
HCT VFR BLD AUTO: 40 % (ref 37–47)
HGB BLD-MCNC: 13.9 G/DL (ref 12–16)
HOLDING TUBE BB 8507: NORMAL
IMM GRANULOCYTES # BLD AUTO: 0.09 K/UL (ref 0–0.11)
IMM GRANULOCYTES NFR BLD AUTO: 0.5 % (ref 0–0.9)
LYMPHOCYTES # BLD AUTO: 1.73 K/UL (ref 1–4.8)
LYMPHOCYTES NFR BLD: 9.4 % (ref 22–41)
MCH RBC QN AUTO: 32.2 PG (ref 27–33)
MCHC RBC AUTO-ENTMCNC: 34.8 G/DL (ref 33.6–35)
MCV RBC AUTO: 92.6 FL (ref 81.4–97.8)
MONOCYTES # BLD AUTO: 0.81 K/UL (ref 0–0.85)
MONOCYTES NFR BLD AUTO: 4.4 % (ref 0–13.4)
NEUTROPHILS # BLD AUTO: 15.66 K/UL (ref 2–7.15)
NEUTROPHILS NFR BLD: 85.4 % (ref 44–72)
NRBC # BLD AUTO: 0 K/UL
NRBC BLD-RTO: 0 /100 WBC
PLATELET # BLD AUTO: 244 K/UL (ref 164–446)
PMV BLD AUTO: 10.9 FL (ref 9–12.9)
RBC # BLD AUTO: 4.32 M/UL (ref 4.2–5.4)
WBC # BLD AUTO: 18.4 K/UL (ref 4.8–10.8)

## 2022-08-15 PROCEDURE — A9270 NON-COVERED ITEM OR SERVICE: HCPCS | Performed by: NURSE PRACTITIONER

## 2022-08-15 PROCEDURE — 160002 HCHG RECOVERY MINUTES (STAT): Performed by: OBSTETRICS & GYNECOLOGY

## 2022-08-15 PROCEDURE — 700111 HCHG RX REV CODE 636 W/ 250 OVERRIDE (IP): Performed by: ANESTHESIOLOGY

## 2022-08-15 PROCEDURE — 700102 HCHG RX REV CODE 250 W/ 637 OVERRIDE(OP): Performed by: ANESTHESIOLOGY

## 2022-08-15 PROCEDURE — 700105 HCHG RX REV CODE 258: Performed by: STUDENT IN AN ORGANIZED HEALTH CARE EDUCATION/TRAINING PROGRAM

## 2022-08-15 PROCEDURE — 160035 HCHG PACU - 1ST 60 MINS PHASE I: Performed by: OBSTETRICS & GYNECOLOGY

## 2022-08-15 PROCEDURE — 700101 HCHG RX REV CODE 250: Performed by: ANESTHESIOLOGY

## 2022-08-15 PROCEDURE — A9270 NON-COVERED ITEM OR SERVICE: HCPCS | Performed by: ANESTHESIOLOGY

## 2022-08-15 PROCEDURE — 10907ZC DRAINAGE OF AMNIOTIC FLUID, THERAPEUTIC FROM PRODUCTS OF CONCEPTION, VIA NATURAL OR ARTIFICIAL OPENING: ICD-10-PCS | Performed by: OBSTETRICS & GYNECOLOGY

## 2022-08-15 PROCEDURE — 700111 HCHG RX REV CODE 636 W/ 250 OVERRIDE (IP): Performed by: STUDENT IN AN ORGANIZED HEALTH CARE EDUCATION/TRAINING PROGRAM

## 2022-08-15 PROCEDURE — 88307 TISSUE EXAM BY PATHOLOGIST: CPT

## 2022-08-15 PROCEDURE — 700102 HCHG RX REV CODE 250 W/ 637 OVERRIDE(OP): Performed by: STUDENT IN AN ORGANIZED HEALTH CARE EDUCATION/TRAINING PROGRAM

## 2022-08-15 PROCEDURE — 36415 COLL VENOUS BLD VENIPUNCTURE: CPT

## 2022-08-15 PROCEDURE — 770002 HCHG ROOM/CARE - OB PRIVATE (112)

## 2022-08-15 PROCEDURE — 01961 ANES CESAREAN DELIVERY ONLY: CPT | Performed by: ANESTHESIOLOGY

## 2022-08-15 PROCEDURE — 85025 COMPLETE CBC W/AUTO DIFF WBC: CPT

## 2022-08-15 PROCEDURE — 700105 HCHG RX REV CODE 258: Performed by: ANESTHESIOLOGY

## 2022-08-15 PROCEDURE — 160041 HCHG SURGERY MINUTES - EA ADDL 1 MIN LEVEL 4: Performed by: OBSTETRICS & GYNECOLOGY

## 2022-08-15 PROCEDURE — 160048 HCHG OR STATISTICAL LEVEL 1-5: Performed by: OBSTETRICS & GYNECOLOGY

## 2022-08-15 PROCEDURE — 700111 HCHG RX REV CODE 636 W/ 250 OVERRIDE (IP)

## 2022-08-15 PROCEDURE — 302449 STATCHG TRIAGE ONLY (STATISTIC)

## 2022-08-15 PROCEDURE — 700102 HCHG RX REV CODE 250 W/ 637 OVERRIDE(OP): Performed by: NURSE PRACTITIONER

## 2022-08-15 PROCEDURE — 59515 CESAREAN DELIVERY: CPT | Performed by: OBSTETRICS & GYNECOLOGY

## 2022-08-15 PROCEDURE — 160029 HCHG SURGERY MINUTES - 1ST 30 MINS LEVEL 4: Performed by: OBSTETRICS & GYNECOLOGY

## 2022-08-15 PROCEDURE — 160009 HCHG ANES TIME/MIN: Performed by: OBSTETRICS & GYNECOLOGY

## 2022-08-15 PROCEDURE — C1755 CATHETER, INTRASPINAL: HCPCS | Performed by: OBSTETRICS & GYNECOLOGY

## 2022-08-15 PROCEDURE — A9270 NON-COVERED ITEM OR SERVICE: HCPCS | Performed by: STUDENT IN AN ORGANIZED HEALTH CARE EDUCATION/TRAINING PROGRAM

## 2022-08-15 RX ORDER — ACETAMINOPHEN 500 MG
1000 TABLET ORAL EVERY 6 HOURS PRN
Status: DISCONTINUED | OUTPATIENT
Start: 2022-08-19 | End: 2022-08-18 | Stop reason: HOSPADM

## 2022-08-15 RX ORDER — MEPERIDINE HYDROCHLORIDE 25 MG/ML
INJECTION INTRAMUSCULAR; INTRAVENOUS; SUBCUTANEOUS PRN
Status: DISCONTINUED | OUTPATIENT
Start: 2022-08-15 | End: 2022-08-15 | Stop reason: SURG

## 2022-08-15 RX ORDER — HYDROMORPHONE HYDROCHLORIDE 1 MG/ML
0.4 INJECTION, SOLUTION INTRAMUSCULAR; INTRAVENOUS; SUBCUTANEOUS
Status: ACTIVE | OUTPATIENT
Start: 2022-08-15 | End: 2022-08-16

## 2022-08-15 RX ORDER — CARBOPROST TROMETHAMINE 250 UG/ML
250 INJECTION, SOLUTION INTRAMUSCULAR
Status: DISCONTINUED | OUTPATIENT
Start: 2022-08-15 | End: 2022-08-15 | Stop reason: HOSPADM

## 2022-08-15 RX ORDER — ONDANSETRON 2 MG/ML
INJECTION INTRAMUSCULAR; INTRAVENOUS PRN
Status: DISCONTINUED | OUTPATIENT
Start: 2022-08-15 | End: 2022-08-15 | Stop reason: SURG

## 2022-08-15 RX ORDER — METHYLERGONOVINE MALEATE 0.2 MG/ML
0.2 INJECTION INTRAVENOUS
Status: DISCONTINUED | OUTPATIENT
Start: 2022-08-15 | End: 2022-08-15 | Stop reason: HOSPADM

## 2022-08-15 RX ORDER — ACETAMINOPHEN 500 MG
1000 TABLET ORAL EVERY 6 HOURS
Status: DISCONTINUED | OUTPATIENT
Start: 2022-08-16 | End: 2022-08-18 | Stop reason: HOSPADM

## 2022-08-15 RX ORDER — TERBUTALINE SULFATE 1 MG/ML
0.25 INJECTION, SOLUTION SUBCUTANEOUS
Status: COMPLETED | OUTPATIENT
Start: 2022-08-15 | End: 2022-08-15

## 2022-08-15 RX ORDER — KETOROLAC TROMETHAMINE 30 MG/ML
30 INJECTION, SOLUTION INTRAMUSCULAR; INTRAVENOUS EVERY 6 HOURS
Status: DISPENSED | OUTPATIENT
Start: 2022-08-15 | End: 2022-08-16

## 2022-08-15 RX ORDER — ALUMINA, MAGNESIA, AND SIMETHICONE 2400; 2400; 240 MG/30ML; MG/30ML; MG/30ML
30 SUSPENSION ORAL EVERY 6 HOURS PRN
Status: DISCONTINUED | OUTPATIENT
Start: 2022-08-15 | End: 2022-08-15 | Stop reason: HOSPADM

## 2022-08-15 RX ORDER — OXYTOCIN 10 [USP'U]/ML
10 INJECTION, SOLUTION INTRAMUSCULAR; INTRAVENOUS
Status: DISCONTINUED | OUTPATIENT
Start: 2022-08-15 | End: 2022-08-15 | Stop reason: HOSPADM

## 2022-08-15 RX ORDER — VITAMIN A ACETATE, BETA CAROTENE, ASCORBIC ACID, CHOLECALCIFEROL, .ALPHA.-TOCOPHEROL ACETATE, DL-, THIAMINE MONONITRATE, RIBOFLAVIN, NIACINAMIDE, PYRIDOXINE HYDROCHLORIDE, FOLIC ACID, CYANOCOBALAMIN, CALCIUM CARBONATE, FERROUS FUMARATE, ZINC OXIDE, CUPRIC OXIDE 3080; 12; 120; 400; 1; 1.84; 3; 20; 22; 920; 25; 200; 27; 10; 2 [IU]/1; UG/1; MG/1; [IU]/1; MG/1; MG/1; MG/1; MG/1; MG/1; [IU]/1; MG/1; MG/1; MG/1; MG/1; MG/1
1 TABLET, FILM COATED ORAL
Status: DISCONTINUED | OUTPATIENT
Start: 2022-08-15 | End: 2022-08-18 | Stop reason: HOSPADM

## 2022-08-15 RX ORDER — DIPHENHYDRAMINE HYDROCHLORIDE 50 MG/ML
25 INJECTION INTRAMUSCULAR; INTRAVENOUS EVERY 6 HOURS PRN
Status: DISCONTINUED | OUTPATIENT
Start: 2022-08-16 | End: 2022-08-18 | Stop reason: HOSPADM

## 2022-08-15 RX ORDER — SODIUM CHLORIDE, SODIUM GLUCONATE, SODIUM ACETATE, POTASSIUM CHLORIDE AND MAGNESIUM CHLORIDE 526; 502; 368; 37; 30 MG/100ML; MG/100ML; MG/100ML; MG/100ML; MG/100ML
1000 INJECTION, SOLUTION INTRAVENOUS ONCE
Status: COMPLETED | OUTPATIENT
Start: 2022-08-15 | End: 2022-08-15

## 2022-08-15 RX ORDER — OXYCODONE HYDROCHLORIDE 5 MG/1
5 TABLET ORAL EVERY 4 HOURS PRN
Status: DISCONTINUED | OUTPATIENT
Start: 2022-08-16 | End: 2022-08-18 | Stop reason: HOSPADM

## 2022-08-15 RX ORDER — DIPHENHYDRAMINE HCL 25 MG
25 TABLET ORAL EVERY 6 HOURS PRN
Status: DISCONTINUED | OUTPATIENT
Start: 2022-08-16 | End: 2022-08-18 | Stop reason: HOSPADM

## 2022-08-15 RX ORDER — CITRIC ACID/SODIUM CITRATE 334-500MG
30 SOLUTION, ORAL ORAL ONCE
Status: COMPLETED | OUTPATIENT
Start: 2022-08-15 | End: 2022-08-15

## 2022-08-15 RX ORDER — HYDROMORPHONE HYDROCHLORIDE 1 MG/ML
0.4 INJECTION, SOLUTION INTRAMUSCULAR; INTRAVENOUS; SUBCUTANEOUS
Status: DISCONTINUED | OUTPATIENT
Start: 2022-08-15 | End: 2022-08-15 | Stop reason: HOSPADM

## 2022-08-15 RX ORDER — ACETAMINOPHEN 500 MG
1000 TABLET ORAL EVERY 6 HOURS
Status: COMPLETED | OUTPATIENT
Start: 2022-08-15 | End: 2022-08-16

## 2022-08-15 RX ORDER — ONDANSETRON 4 MG/1
4 TABLET, ORALLY DISINTEGRATING ORAL EVERY 6 HOURS PRN
Status: DISCONTINUED | OUTPATIENT
Start: 2022-08-17 | End: 2022-08-18 | Stop reason: HOSPADM

## 2022-08-15 RX ORDER — ACETAMINOPHEN 500 MG
1000 TABLET ORAL EVERY 4 HOURS PRN
Status: DISCONTINUED | OUTPATIENT
Start: 2022-08-15 | End: 2022-08-15

## 2022-08-15 RX ORDER — BUPIVACAINE HYDROCHLORIDE 7.5 MG/ML
INJECTION, SOLUTION INTRASPINAL
Status: COMPLETED | OUTPATIENT
Start: 2022-08-15 | End: 2022-08-15

## 2022-08-15 RX ORDER — IBUPROFEN 800 MG/1
800 TABLET ORAL EVERY 8 HOURS
Status: DISCONTINUED | OUTPATIENT
Start: 2022-08-16 | End: 2022-08-18 | Stop reason: HOSPADM

## 2022-08-15 RX ORDER — IBUPROFEN 800 MG/1
800 TABLET ORAL EVERY 8 HOURS PRN
Status: DISCONTINUED | OUTPATIENT
Start: 2022-08-19 | End: 2022-08-18 | Stop reason: HOSPADM

## 2022-08-15 RX ORDER — OXYCODONE HCL 5 MG/5 ML
5 SOLUTION, ORAL ORAL
Status: DISCONTINUED | OUTPATIENT
Start: 2022-08-15 | End: 2022-08-15 | Stop reason: HOSPADM

## 2022-08-15 RX ORDER — METOCLOPRAMIDE HYDROCHLORIDE 5 MG/ML
10 INJECTION INTRAMUSCULAR; INTRAVENOUS ONCE
Status: COMPLETED | OUTPATIENT
Start: 2022-08-15 | End: 2022-08-15

## 2022-08-15 RX ORDER — DOCUSATE SODIUM 100 MG/1
100 CAPSULE, LIQUID FILLED ORAL 2 TIMES DAILY PRN
Status: DISCONTINUED | OUTPATIENT
Start: 2022-08-15 | End: 2022-08-18 | Stop reason: HOSPADM

## 2022-08-15 RX ORDER — CEFAZOLIN SODIUM 1 G/3ML
INJECTION, POWDER, FOR SOLUTION INTRAMUSCULAR; INTRAVENOUS PRN
Status: DISCONTINUED | OUTPATIENT
Start: 2022-08-15 | End: 2022-08-15 | Stop reason: SURG

## 2022-08-15 RX ORDER — OXYCODONE HYDROCHLORIDE 5 MG/1
10 TABLET ORAL EVERY 4 HOURS PRN
Status: ACTIVE | OUTPATIENT
Start: 2022-08-15 | End: 2022-08-16

## 2022-08-15 RX ORDER — DIPHENHYDRAMINE HYDROCHLORIDE 50 MG/ML
12.5 INJECTION INTRAMUSCULAR; INTRAVENOUS
Status: DISCONTINUED | OUTPATIENT
Start: 2022-08-15 | End: 2022-08-15 | Stop reason: HOSPADM

## 2022-08-15 RX ORDER — SODIUM CHLORIDE, SODIUM GLUCONATE, SODIUM ACETATE, POTASSIUM CHLORIDE AND MAGNESIUM CHLORIDE 526; 502; 368; 37; 30 MG/100ML; MG/100ML; MG/100ML; MG/100ML; MG/100ML
INJECTION, SOLUTION INTRAVENOUS
Status: DISCONTINUED | OUTPATIENT
Start: 2022-08-15 | End: 2022-08-15 | Stop reason: SURG

## 2022-08-15 RX ORDER — MORPHINE SULFATE 0.5 MG/ML
INJECTION, SOLUTION EPIDURAL; INTRATHECAL; INTRAVENOUS
Status: COMPLETED | OUTPATIENT
Start: 2022-08-15 | End: 2022-08-15

## 2022-08-15 RX ORDER — ONDANSETRON 2 MG/ML
4 INJECTION INTRAMUSCULAR; INTRAVENOUS EVERY 6 HOURS PRN
Status: DISCONTINUED | OUTPATIENT
Start: 2022-08-17 | End: 2022-08-18 | Stop reason: HOSPADM

## 2022-08-15 RX ORDER — SODIUM CHLORIDE, SODIUM LACTATE, POTASSIUM CHLORIDE, CALCIUM CHLORIDE 600; 310; 30; 20 MG/100ML; MG/100ML; MG/100ML; MG/100ML
INJECTION, SOLUTION INTRAVENOUS PRN
Status: DISCONTINUED | OUTPATIENT
Start: 2022-08-15 | End: 2022-08-18 | Stop reason: HOSPADM

## 2022-08-15 RX ORDER — DEXAMETHASONE SODIUM PHOSPHATE 4 MG/ML
INJECTION, SOLUTION INTRA-ARTICULAR; INTRALESIONAL; INTRAMUSCULAR; INTRAVENOUS; SOFT TISSUE PRN
Status: DISCONTINUED | OUTPATIENT
Start: 2022-08-15 | End: 2022-08-15 | Stop reason: SURG

## 2022-08-15 RX ORDER — HYDROMORPHONE HYDROCHLORIDE 1 MG/ML
0.1 INJECTION, SOLUTION INTRAMUSCULAR; INTRAVENOUS; SUBCUTANEOUS
Status: DISCONTINUED | OUTPATIENT
Start: 2022-08-15 | End: 2022-08-15 | Stop reason: HOSPADM

## 2022-08-15 RX ORDER — AZITHROMYCIN 500 MG/5ML
INJECTION, POWDER, LYOPHILIZED, FOR SOLUTION INTRAVENOUS
Status: COMPLETED
Start: 2022-08-15 | End: 2022-08-15

## 2022-08-15 RX ORDER — MISOPROSTOL 200 UG/1
800 TABLET ORAL
Status: DISCONTINUED | OUTPATIENT
Start: 2022-08-15 | End: 2022-08-15 | Stop reason: HOSPADM

## 2022-08-15 RX ORDER — ONDANSETRON 2 MG/ML
4 INJECTION INTRAMUSCULAR; INTRAVENOUS
Status: DISCONTINUED | OUTPATIENT
Start: 2022-08-15 | End: 2022-08-15 | Stop reason: HOSPADM

## 2022-08-15 RX ORDER — ONDANSETRON 4 MG/1
4 TABLET, ORALLY DISINTEGRATING ORAL EVERY 6 HOURS PRN
Status: DISCONTINUED | OUTPATIENT
Start: 2022-08-15 | End: 2022-08-15 | Stop reason: HOSPADM

## 2022-08-15 RX ORDER — ONDANSETRON 2 MG/ML
4 INJECTION INTRAMUSCULAR; INTRAVENOUS EVERY 6 HOURS PRN
Status: ACTIVE | OUTPATIENT
Start: 2022-08-15 | End: 2022-08-16

## 2022-08-15 RX ORDER — SODIUM CHLORIDE, SODIUM LACTATE, POTASSIUM CHLORIDE, CALCIUM CHLORIDE 600; 310; 30; 20 MG/100ML; MG/100ML; MG/100ML; MG/100ML
INJECTION, SOLUTION INTRAVENOUS CONTINUOUS
Status: DISCONTINUED | OUTPATIENT
Start: 2022-08-15 | End: 2022-08-15

## 2022-08-15 RX ORDER — OXYCODONE HYDROCHLORIDE 5 MG/1
5 TABLET ORAL EVERY 4 HOURS PRN
Status: ACTIVE | OUTPATIENT
Start: 2022-08-15 | End: 2022-08-16

## 2022-08-15 RX ORDER — ONDANSETRON 2 MG/ML
4 INJECTION INTRAMUSCULAR; INTRAVENOUS EVERY 6 HOURS PRN
Status: DISCONTINUED | OUTPATIENT
Start: 2022-08-15 | End: 2022-08-15 | Stop reason: HOSPADM

## 2022-08-15 RX ORDER — HYDROMORPHONE HYDROCHLORIDE 1 MG/ML
0.2 INJECTION, SOLUTION INTRAMUSCULAR; INTRAVENOUS; SUBCUTANEOUS
Status: ACTIVE | OUTPATIENT
Start: 2022-08-15 | End: 2022-08-16

## 2022-08-15 RX ORDER — HALOPERIDOL 5 MG/ML
1 INJECTION INTRAMUSCULAR
Status: DISCONTINUED | OUTPATIENT
Start: 2022-08-15 | End: 2022-08-15 | Stop reason: HOSPADM

## 2022-08-15 RX ORDER — KETOROLAC TROMETHAMINE 30 MG/ML
INJECTION, SOLUTION INTRAMUSCULAR; INTRAVENOUS PRN
Status: DISCONTINUED | OUTPATIENT
Start: 2022-08-15 | End: 2022-08-15 | Stop reason: SURG

## 2022-08-15 RX ORDER — HYDROXYZINE 50 MG/1
50 TABLET, FILM COATED ORAL EVERY 6 HOURS PRN
Status: DISCONTINUED | OUTPATIENT
Start: 2022-08-15 | End: 2022-08-15 | Stop reason: HOSPADM

## 2022-08-15 RX ORDER — ACETAMINOPHEN 500 MG
1000 TABLET ORAL
Status: DISCONTINUED | OUTPATIENT
Start: 2022-08-15 | End: 2022-08-15

## 2022-08-15 RX ORDER — MEPERIDINE HYDROCHLORIDE 25 MG/ML
6.25 INJECTION INTRAMUSCULAR; INTRAVENOUS; SUBCUTANEOUS
Status: DISCONTINUED | OUTPATIENT
Start: 2022-08-15 | End: 2022-08-15 | Stop reason: HOSPADM

## 2022-08-15 RX ORDER — OXYCODONE HYDROCHLORIDE 5 MG/1
10 TABLET ORAL EVERY 4 HOURS PRN
Status: DISCONTINUED | OUTPATIENT
Start: 2022-08-16 | End: 2022-08-18 | Stop reason: HOSPADM

## 2022-08-15 RX ORDER — OXYCODONE HCL 5 MG/5 ML
10 SOLUTION, ORAL ORAL
Status: DISCONTINUED | OUTPATIENT
Start: 2022-08-15 | End: 2022-08-15 | Stop reason: HOSPADM

## 2022-08-15 RX ORDER — IBUPROFEN 800 MG/1
800 TABLET ORAL
Status: DISCONTINUED | OUTPATIENT
Start: 2022-08-15 | End: 2022-08-15 | Stop reason: HOSPADM

## 2022-08-15 RX ORDER — ALBUTEROL SULFATE 2.5 MG/3ML
2.5 SOLUTION RESPIRATORY (INHALATION)
Status: DISCONTINUED | OUTPATIENT
Start: 2022-08-15 | End: 2022-08-15 | Stop reason: HOSPADM

## 2022-08-15 RX ORDER — HYDROMORPHONE HYDROCHLORIDE 1 MG/ML
0.2 INJECTION, SOLUTION INTRAMUSCULAR; INTRAVENOUS; SUBCUTANEOUS
Status: DISCONTINUED | OUTPATIENT
Start: 2022-08-15 | End: 2022-08-15 | Stop reason: HOSPADM

## 2022-08-15 RX ORDER — DIPHENHYDRAMINE HYDROCHLORIDE 50 MG/ML
12.5 INJECTION INTRAMUSCULAR; INTRAVENOUS EVERY 6 HOURS PRN
Status: ACTIVE | OUTPATIENT
Start: 2022-08-15 | End: 2022-08-16

## 2022-08-15 RX ADMIN — PHENYLEPHRINE HYDROCHLORIDE 100 MCG/MIN: 10 INJECTION INTRAVENOUS at 15:45

## 2022-08-15 RX ADMIN — TERBUTALINE SULFATE 0.25 MG: 1 INJECTION SUBCUTANEOUS at 15:31

## 2022-08-15 RX ADMIN — MORPHINE SULFATE 150 MCG: 0.5 INJECTION, SOLUTION EPIDURAL; INTRATHECAL; INTRAVENOUS at 15:47

## 2022-08-15 RX ADMIN — SODIUM CHLORIDE, POTASSIUM CHLORIDE, SODIUM LACTATE AND CALCIUM CHLORIDE: 600; 310; 30; 20 INJECTION, SOLUTION INTRAVENOUS at 13:11

## 2022-08-15 RX ADMIN — AZITHROMYCIN MONOHYDRATE 500 MG: 500 INJECTION, POWDER, LYOPHILIZED, FOR SOLUTION INTRAVENOUS at 15:49

## 2022-08-15 RX ADMIN — MEPERIDINE HYDROCHLORIDE 25 MG: 25 INJECTION INTRAMUSCULAR; INTRAVENOUS; SUBCUTANEOUS at 16:21

## 2022-08-15 RX ADMIN — PRENATAL WITH FERROUS FUM AND FOLIC ACID 1 TABLET: 3080; 920; 120; 400; 22; 1.84; 3; 20; 10; 1; 12; 200; 27; 25; 2 TABLET ORAL at 19:42

## 2022-08-15 RX ADMIN — DEXAMETHASONE SODIUM PHOSPHATE 4 MG: 4 INJECTION, SOLUTION INTRA-ARTICULAR; INTRALESIONAL; INTRAMUSCULAR; INTRAVENOUS; SOFT TISSUE at 16:14

## 2022-08-15 RX ADMIN — MEPERIDINE HYDROCHLORIDE 25 MG: 25 INJECTION INTRAMUSCULAR; INTRAVENOUS; SUBCUTANEOUS at 16:17

## 2022-08-15 RX ADMIN — ACETAMINOPHEN 1000 MG: 500 TABLET ORAL at 19:42

## 2022-08-15 RX ADMIN — KETOROLAC TROMETHAMINE 30 MG: 30 INJECTION, SOLUTION INTRAMUSCULAR at 16:14

## 2022-08-15 RX ADMIN — SERTRALINE 50 MG: 50 TABLET, FILM COATED ORAL at 21:18

## 2022-08-15 RX ADMIN — BUPIVACAINE HYDROCHLORIDE IN DEXTROSE 1.8 ML: 7.5 INJECTION, SOLUTION SUBARACHNOID at 15:47

## 2022-08-15 RX ADMIN — ONDANSETRON 4 MG: 2 INJECTION INTRAMUSCULAR; INTRAVENOUS at 16:14

## 2022-08-15 RX ADMIN — METOCLOPRAMIDE 10 MG: 5 INJECTION, SOLUTION INTRAMUSCULAR; INTRAVENOUS at 15:41

## 2022-08-15 RX ADMIN — KETOROLAC TROMETHAMINE 30 MG: 30 INJECTION, SOLUTION INTRAMUSCULAR at 22:02

## 2022-08-15 RX ADMIN — FAMOTIDINE 20 MG: 10 INJECTION, SOLUTION INTRAVENOUS at 15:41

## 2022-08-15 RX ADMIN — SODIUM CHLORIDE, SODIUM GLUCONATE, SODIUM ACETATE, POTASSIUM CHLORIDE AND MAGNESIUM CHLORIDE 1000 ML: 526; 502; 368; 37; 30 INJECTION, SOLUTION INTRAVENOUS at 15:39

## 2022-08-15 RX ADMIN — FENTANYL CITRATE 100 MCG: 50 INJECTION, SOLUTION INTRAMUSCULAR; INTRAVENOUS at 13:07

## 2022-08-15 RX ADMIN — OXYTOCIN 1000 ML: 10 INJECTION, SOLUTION INTRAMUSCULAR; INTRAVENOUS at 16:08

## 2022-08-15 RX ADMIN — SODIUM CITRATE AND CITRIC ACID MONOHYDRATE 30 ML: 500; 334 SOLUTION ORAL at 15:41

## 2022-08-15 RX ADMIN — FENTANYL CITRATE 100 MCG: 50 INJECTION, SOLUTION INTRAMUSCULAR; INTRAVENOUS at 14:09

## 2022-08-15 RX ADMIN — CEFAZOLIN 2 G: 330 INJECTION, POWDER, FOR SOLUTION INTRAMUSCULAR; INTRAVENOUS at 15:54

## 2022-08-15 RX ADMIN — AZITHROMYCIN FOR INJECTION INJECTION, POWDER, LYOPHILIZED, FOR SOLUTION 500 MG: 500 INJECTION INTRAVENOUS at 15:45

## 2022-08-15 RX ADMIN — SODIUM CHLORIDE, SODIUM GLUCONATE, SODIUM ACETATE, POTASSIUM CHLORIDE AND MAGNESIUM CHLORIDE: 526; 502; 368; 37; 30 INJECTION, SOLUTION INTRAVENOUS at 15:44

## 2022-08-15 RX ADMIN — Medication 125 ML/HR: at 17:41

## 2022-08-15 RX ADMIN — PHENYLEPHRINE HYDROCHLORIDE: 10 INJECTION INTRAVENOUS at 15:45

## 2022-08-15 ASSESSMENT — COPD QUESTIONNAIRES
IN THE PAST 12 MONTHS DO YOU DO LESS THAN YOU USED TO BECAUSE OF YOUR BREATHING PROBLEMS: DISAGREE/UNSURE
DURING THE PAST 4 WEEKS HOW MUCH DID YOU FEEL SHORT OF BREATH: NONE/LITTLE OF THE TIME
DO YOU EVER COUGH UP ANY MUCUS OR PHLEGM?: NO/ONLY WITH OCCASIONAL COLDS OR INFECTIONS
HAVE YOU SMOKED AT LEAST 100 CIGARETTES IN YOUR ENTIRE LIFE: NO/DON'T KNOW

## 2022-08-15 ASSESSMENT — PAIN DESCRIPTION - PAIN TYPE
TYPE: ACUTE PAIN
TYPE: ACUTE PAIN;SURGICAL PAIN
TYPE: ACUTE PAIN
TYPE: ACUTE PAIN;SURGICAL PAIN
TYPE: ACUTE PAIN
TYPE: ACUTE PAIN;SURGICAL PAIN
TYPE: ACUTE PAIN
TYPE: ACUTE PAIN;SURGICAL PAIN

## 2022-08-15 ASSESSMENT — LIFESTYLE VARIABLES
ALCOHOL_USE: NO
DOES PATIENT WANT TO STOP DRINKING: CANNOT ASSESS
EVER_SMOKED: NEVER

## 2022-08-15 ASSESSMENT — FIBROSIS 4 INDEX: FIB4 SCORE: 0.54

## 2022-08-15 ASSESSMENT — PAIN SCALES - GENERAL: PAIN_LEVEL: 0

## 2022-08-15 NOTE — ANESTHESIA PREPROCEDURE EVALUATION
Case: [id number] Date/Time: 08/15/22 1545    Procedure:  SECTION, PRIMARY (Abdomen)    Anesthesia type: Spinal    Pre-op diagnosis: 39.4 WEEKS, ACTIVE LABOR, BREECH PRESENTATION    Location: [facility] OR 01 / SURGERY LABOR AND DELIVERY    Surgeons: Hellen Brody D.O.          Relevant Problems   No relevant active problems       Physical Exam    Airway   Mallampati: II  TM distance: >3 FB  Neck ROM: full       Cardiovascular - normal exam  Rhythm: regular  Rate: normal  (-) murmur     Dental - normal exam           Pulmonary - normal exam  Breath sounds clear to auscultation     Abdominal    Neurological - normal exam                 Anesthesia Plan    ASA 2       Plan - spinal   Neuraxial block will be primary anesthetic                Postoperative Plan: Postoperative administration of opioids is intended.    Pertinent diagnostic labs and testing reviewed    Informed Consent:    Anesthetic plan and risks discussed with patient.

## 2022-08-15 NOTE — H&P
OB H&P:    CC: painful contractions    HPI:  Kyleigh Adams is a 26 y.o.  @ 39w4d by LMP c/w 11 week U/S presenting for painful uterine contractions starting at 6 AM. They are occurring every 2-3 minutes and patient is breathing through them. She had some bloody show but denies heavy vaginal bleeding. She denies complications during this pregnancy. Review of chart shows there was some concern about overriding aorta on fetal U/S but [facility].    Contractions: Yes   Loss of fluid: No   Vaginal bleeding: Yes   Fetal movement: Normal      PNC with [facility] starting at 15 weeks GA    PNL:  Blood type A+, RI, HIV NR, RPR NR, HBsAg NR, Hep C antibody NR, GC/CT neg/neg  GBS negative  1-hour GTT 83    PMH: patient reports h/o depression and anxiety, historically told she had hyperlipidemia    Meds: PNV and sertraline    ROS:  Const: denies fevers, general concerns  ENT: Denies rhinorrhea, congestion, sore throat  CV: Denies CP or significant peripheral edema  Resp: Denies dyspnea, cough  GI: denies abd pain, GI concerns  : see HPI  Neuro: denies new HA or vision changes    OB History    Para Term  AB Living   1             SAB IAB Ectopic Molar Multiple Live Births                    # Outcome Date GA Lbr Gigi/2nd Weight Sex Delivery Anes PTL Lv   1 Current                GYN: denies STIs, no cervical procedures    Past Medical History:   Diagnosis Date    Anxiety     Chronic fatigue syndrome     Depression     Hyperlipidemia        Past Surgical History:   Procedure Laterality Date    DENTAL EXTRACTION(S)      wisdom       No current facility-administered medications on file prior to encounter.     Current Outpatient Medications on File Prior to Encounter   Medication Sig Dispense Refill    Prenatal Vit-Fe Fumarate-FA (PRENATAL PO) Take  by mouth.         Family History   Problem Relation Age of Onset    Thyroid Mother     Hypertension Father     Thyroid  Father     Cancer Maternal Grandfather         skin    Cancer Paternal Grandfather         non-hod lymphoma       Social History     Socioeconomic History    Marital status:      Spouse name: Not on file    Number of children: Not on file    Years of education: Not on file    Highest education level: Bachelor's degree (e.g., BA, AB, BS)   Occupational History    Not on file   Tobacco Use    Smoking status: Never    Smokeless tobacco: Never   Vaping Use    Vaping Use: Never used   Substance and Sexual Activity    Alcohol use: Not Currently     Comment: 1 to 2 times a month    Drug use: Never    Sexual activity: Yes     Partners: Male     Birth control/protection: None     Comment:    Other Topics Concern    Not on file   Social History Narrative    Not on file     Social Determinants of Health     Financial Resource Strain: Low Risk     Difficulty of Paying Living Expenses: Not hard at all   Food Insecurity: No Food Insecurity    Worried About Running Out of Food in the Last Year: Never true    Ran Out of Food in the Last Year: Never true   Transportation Needs: No Transportation Needs    Lack of Transportation (Medical): No    Lack of Transportation (Non-Medical): No   Physical Activity: Insufficiently Active    Days of Exercise per Week: 3 days    Minutes of Exercise per Session: 30 min   Stress: No Stress Concern Present    Feeling of Stress : Only a little   Social Connections: Moderately Isolated    Frequency of Communication with Friends and Family: More than three times a week    Frequency of Social Gatherings with Friends and Family: Three times a week    Attends Roman Catholic Services: Never    Active Member of Clubs or Organizations: No    Attends Club or Organization Meetings: Never    Marital Status:    Intimate Partner Violence: Not on file   Housing Stability: Low Risk     Unable to Pay for Housing in the Last Year: No    Number of Places Lived in the Last Year: 1    Unstable Housing in  "the Last Year: No       PE:  Vitals:    08/15/22 1004 08/15/22 1012   BP:  123/81   Pulse:  86   Resp:  16   Temp:  36.8 °C (98.3 °F)   TempSrc:  Temporal   SpO2:  96%   Weight: 80.3 kg (177 lb)    Height: 1.626 m (5' 4\")      Gen: alert, conversant, no acute distress  CV: RRR, nl S1 and S2 without murmurs, cap refill <2 sec  Resp: Unlabored respirations, symmetric chest rise, CTAB  abd: soft, gravid, NT, EFW 3200 g  Ext: NT, no edema  Neuro: No gross focal deficits, sensation intact to light touch throughout    SVE: 3-4/90/-2 @ 10:19 by RN exam  FHT: 130/moderate variability/+ accels/ no decels  davida: Ctx Q2-3 minutes    A/P: 26 y.o.  @ 39w4d by LMP c/w 11 week U/S presenting with regular painful contractions and bloody show.   Last in-office SVE closed, now 3-4/90/-2  Membranes intact  FHT category 1  GBS negative    Admit to L&D  IV access  Continue to monitor labor progress  Desires unmedicated delivery but open to pharmacologic pain control if needed; discussed available options with patient  Anticipate     Zakiya Reyna M.D.    "

## 2022-08-15 NOTE — ANESTHESIA POSTPROCEDURE EVALUATION
Patient: Kyleigh Adams    Procedure Summary     Date: 08/15/22 Room / Location: [facility] OR 01 / SURGERY LABOR AND DELIVERY    Anesthesia Start: 154 Anesthesia Stop:     Procedure:  SECTION, PRIMARY (Abdomen) Diagnosis:        delivery delivered      (39.4 WEEKS, ACTIVE LABOR, BREECH PRESENTATION)    Surgeons: Hellen Brody D.O. Responsible Provider: Itz Hooper M.D.    Anesthesia Type: spinal ASA Status: 2          Final Anesthesia Type: spinal  Last vitals  BP   Blood Pressure: 123/81    Temp   36.8 °C (98.3 °F)    Pulse   86   Resp   16    SpO2   96 %      Anesthesia Post Evaluation    Patient location during evaluation: PACU  Patient participation: complete - patient participated  Level of consciousness: awake and alert  Pain score: 0    Airway patency: patent  Anesthetic complications: no  Cardiovascular status: hemodynamically stable  Respiratory status: acceptable  Hydration status: euvolemic    PONV: none          No notable events documented.     Nurse Pain Score: 5 (NPRS)

## 2022-08-15 NOTE — ED PROVIDER NOTES
Patient spent less than 5 minutes in triage, was noted to be in active labor and admitted. Please see H&P for full details.

## 2022-08-15 NOTE — ANESTHESIA TIME REPORT
Anesthesia Start and Stop Event Times     Date Time Event    8/15/2022 1543 Ready for Procedure     1544 Anesthesia Start     1653 Anesthesia Stop        Responsible Staff  08/15/22    Name Role Begin End    Itz Hooper M.D. Anesth 1544 1653        Overtime Reason:  no overtime (within assigned shift)    Comments:

## 2022-08-15 NOTE — ANESTHESIA PROCEDURE NOTES
Spinal Block    Date/Time: 8/15/2022 3:47 PM  Performed by: Itz Hooper M.D.  Authorized by: Itz Hooper M.D.     Patient Location:  OR  Start Time:  8/15/2022 3:47 PM  End Time:  8/15/2022 3:52 PM  Reason for Block: primary anesthetic    patient identified, IV checked, site marked, risks and benefits discussed, surgical consent, monitors and equipment checked, pre-op evaluation and timeout performed    Patient Position:  Sitting  Prep: ChloraPrep, patient draped and sterile technique    Monitoring:  Blood pressure, continuous pulse oximetry and heart rate  Approach:  Midline  Location:  L3-4  Injection Technique:  Single-shot  Skin infiltration:  Lidocaine  Strength:  1%  Dose:  3ml  Needle Type:  Pencan  Needle Gauge:  25 G  CSF flowing pre/post injection:  Yes  Sensory Level:  T4

## 2022-08-16 LAB
ERYTHROCYTE [DISTWIDTH] IN BLOOD BY AUTOMATED COUNT: 44.5 FL (ref 35.9–50)
HCT VFR BLD AUTO: 32 % (ref 37–47)
HGB BLD-MCNC: 11.1 G/DL (ref 12–16)
MCH RBC QN AUTO: 32.3 PG (ref 27–33)
MCHC RBC AUTO-ENTMCNC: 34.7 G/DL (ref 33.6–35)
MCV RBC AUTO: 93 FL (ref 81.4–97.8)
PATHOLOGY CONSULT NOTE: NORMAL
PLATELET # BLD AUTO: 198 K/UL (ref 164–446)
PMV BLD AUTO: 10.7 FL (ref 9–12.9)
RBC # BLD AUTO: 3.44 M/UL (ref 4.2–5.4)
WBC # BLD AUTO: 21.2 K/UL (ref 4.8–10.8)

## 2022-08-16 PROCEDURE — A9270 NON-COVERED ITEM OR SERVICE: HCPCS | Performed by: STUDENT IN AN ORGANIZED HEALTH CARE EDUCATION/TRAINING PROGRAM

## 2022-08-16 PROCEDURE — A9270 NON-COVERED ITEM OR SERVICE: HCPCS | Performed by: NURSE PRACTITIONER

## 2022-08-16 PROCEDURE — 700102 HCHG RX REV CODE 250 W/ 637 OVERRIDE(OP): Performed by: NURSE PRACTITIONER

## 2022-08-16 PROCEDURE — 700111 HCHG RX REV CODE 636 W/ 250 OVERRIDE (IP): Performed by: ANESTHESIOLOGY

## 2022-08-16 PROCEDURE — 700102 HCHG RX REV CODE 250 W/ 637 OVERRIDE(OP): Performed by: ANESTHESIOLOGY

## 2022-08-16 PROCEDURE — 770002 HCHG ROOM/CARE - OB PRIVATE (112)

## 2022-08-16 PROCEDURE — 36415 COLL VENOUS BLD VENIPUNCTURE: CPT

## 2022-08-16 PROCEDURE — 700102 HCHG RX REV CODE 250 W/ 637 OVERRIDE(OP): Performed by: STUDENT IN AN ORGANIZED HEALTH CARE EDUCATION/TRAINING PROGRAM

## 2022-08-16 PROCEDURE — A9270 NON-COVERED ITEM OR SERVICE: HCPCS | Performed by: ANESTHESIOLOGY

## 2022-08-16 PROCEDURE — 85027 COMPLETE CBC AUTOMATED: CPT

## 2022-08-16 RX ADMIN — ACETAMINOPHEN 1000 MG: 500 TABLET ORAL at 07:11

## 2022-08-16 RX ADMIN — ACETAMINOPHEN 1000 MG: 500 TABLET ORAL at 13:06

## 2022-08-16 RX ADMIN — ACETAMINOPHEN 1000 MG: 500 TABLET ORAL at 01:31

## 2022-08-16 RX ADMIN — KETOROLAC TROMETHAMINE 30 MG: 30 INJECTION, SOLUTION INTRAMUSCULAR at 15:59

## 2022-08-16 RX ADMIN — PRENATAL WITH FERROUS FUM AND FOLIC ACID 1 TABLET: 3080; 920; 120; 400; 22; 1.84; 3; 20; 10; 1; 12; 200; 27; 25; 2 TABLET ORAL at 07:55

## 2022-08-16 RX ADMIN — KETOROLAC TROMETHAMINE 30 MG: 30 INJECTION, SOLUTION INTRAMUSCULAR at 10:27

## 2022-08-16 RX ADMIN — ACETAMINOPHEN 1000 MG: 500 TABLET ORAL at 20:13

## 2022-08-16 RX ADMIN — IBUPROFEN 800 MG: 800 TABLET, FILM COATED ORAL at 22:26

## 2022-08-16 RX ADMIN — SERTRALINE 50 MG: 50 TABLET, FILM COATED ORAL at 18:26

## 2022-08-16 ASSESSMENT — PAIN DESCRIPTION - PAIN TYPE
TYPE: ACUTE PAIN;SURGICAL PAIN
TYPE: ACUTE PAIN
TYPE: ACUTE PAIN;SURGICAL PAIN
TYPE: ACUTE PAIN;SURGICAL PAIN
TYPE: ACUTE PAIN
TYPE: ACUTE PAIN

## 2022-08-16 ASSESSMENT — EDINBURGH POSTNATAL DEPRESSION SCALE (EPDS)
I HAVE BEEN SO UNHAPPY THAT I HAVE HAD DIFFICULTY SLEEPING: NOT VERY OFTEN
I HAVE BEEN SO UNHAPPY THAT I HAVE BEEN CRYING: NO, NEVER
I HAVE BEEN ANXIOUS OR WORRIED FOR NO GOOD REASON: YES, SOMETIMES
THINGS HAVE BEEN GETTING ON TOP OF ME: NO, I HAVE BEEN COPING AS WELL AS EVER
I HAVE BLAMED MYSELF UNNECESSARILY WHEN THINGS WENT WRONG: NOT VERY OFTEN
I HAVE FELT SAD OR MISERABLE: NO, NOT AT ALL
THE THOUGHT OF HARMING MYSELF HAS OCCURRED TO ME: NEVER
I HAVE FELT SCARED OR PANICKY FOR NO GOOD REASON: NO, NOT MUCH
I HAVE BEEN ABLE TO LAUGH AND SEE THE FUNNY SIDE OF THINGS: AS MUCH AS I ALWAYS COULD
I HAVE LOOKED FORWARD WITH ENJOYMENT TO THINGS: AS MUCH AS I EVER DID

## 2022-08-16 NOTE — CARE PLAN
The patient is Stable - Low risk of patient condition declining or worsening    Shift Goals  Clinical Goals: Patient maintains stable VS. Pain control.  Patient Goals: Healthy mom and baby  Family Goals: Support patient    Progress made toward(s) clinical / shift goals:  Patient maintained stable VS throughout the shift. Pain was maintained through scheduled pain medication administration.     Patient is not progressing towards the following goals:

## 2022-08-16 NOTE — PROGRESS NOTES
Post Partum Progress Note    Name:   Kyleigh Adams   Date/Time:  2022 - 7:09 AM  Chief Admitting Dx:  Labor and delivery, indication for care [O75.9]  Delivery Type:   for breech  Post-Op/Post Partum Days #:  1    Subjective:  Abdominal pain: no  Ambulating:   yes  Tolerating liquids:  yes  Tolerating food:  yes common adult  Flatus:   yes  BM:    no  Bleeding:   with a small amount of bleeding  Voiding:   yes  Dizziness:   no  Feeding:   Pumping and breast    Vitals:    22 0300 22 0401 22 0500 22 0607   BP:    114/72   Pulse: 96 91 96 82   Resp:    Temp:    36.6 °C (97.9 °F)   TempSrc:    Temporal   SpO2: 94% 98% 96% 97%   Weight:       Height:           Exam:  Breast: Tenderness no  Abdomen: Abdomen soft, non-tender. BS normal. No masses,  No organomegaly  Fundal Tenderness:  no  Fundus Firm: yes  Incision: dry and intact; two areas of old blood from immediate post op period  Below umbilicus: yes  Perineum: perineum intact  Lochia: moderate  Extremities: Normal extremities, peripheral pulses and reflexes normal, no edema, redness or tenderness in the calves or thighs    Meds:  Current Facility-Administered Medications   Medication Dose    oxytocin (PITOCIN) infusion (for post delivery)  2,000 mL/hr    Followed by    oxytocin (PITOCIN) infusion (for post delivery)  125 mL/hr    acetaminophen (TYLENOL) tablet 1,000 mg  1,000 mg    ketorolac (TORADOL) injection 30 mg  30 mg    oxyCODONE immediate-release (ROXICODONE) tablet 5 mg  5 mg    oxyCODONE immediate-release (ROXICODONE) tablet 10 mg  10 mg    HYDROmorphone (Dilaudid) injection 0.2 mg  0.2 mg    HYDROmorphone (Dilaudid) injection 0.4 mg  0.4 mg    ondansetron (ZOFRAN) syringe/vial injection 4 mg  4 mg    diphenhydrAMINE (BENADRYL) injection 12.5 mg  12.5 mg    ePHEDrine injection 10 mg  10 mg    lactated ringers infusion      ibuprofen (MOTRIN) tablet 800 mg  800 mg    Followed by    [START ON 2022]  ibuprofen (MOTRIN) tablet 800 mg  800 mg    acetaminophen (TYLENOL) tablet 1,000 mg  1,000 mg    Followed by    [START ON 2022] acetaminophen (TYLENOL) tablet 1,000 mg  1,000 mg    oxyCODONE immediate-release (ROXICODONE) tablet 5 mg  5 mg    oxyCODONE immediate-release (ROXICODONE) tablet 10 mg  10 mg    [START ON 2022] ondansetron (ZOFRAN) syringe/vial injection 4 mg  4 mg    Or    [START ON 2022] ondansetron (ZOFRAN ODT) dispertab 4 mg  4 mg    diphenhydrAMINE (BENADRYL) tablet/capsule 25 mg  25 mg    Or    diphenhydrAMINE (BENADRYL) injection 25 mg  25 mg    docusate sodium (COLACE) capsule 100 mg  100 mg    magnesium hydroxide (MILK OF MAGNESIA) suspension 30 mL  30 mL    prenatal plus vitamin (STUARTNATAL 1+1) 27-1 MG tablet 1 Tablet  1 Tablet    sertraline (Zoloft) tablet 50 mg  50 mg       Labs:   Recent Labs     08/15/22  1140 22  0237   WBC 18.4* 21.2*   RBC 4.32 3.44*   HEMOGLOBIN 13.9 11.1*   HEMATOCRIT 40.0 32.0*   MCV 92.6 93.0   MCH 32.2 32.3   MCHC 34.8 34.7   RDW 44.3 44.5   PLATELETCT 244 198   MPV 10.9 10.7       Assessment:  Chief Admitting Dx:  Labor and delivery, indication for care [O75.9]  Delivery Type:   for breech  Tubal Ligation:  no    Plan:  Continue routine post partum care.  POD #1    AMADO Almanzar

## 2022-08-16 NOTE — OR SURGEON
Immediate Post OP Note    Called to patients room to assess patient. 9 cm dilated and breech presentation noted. Discussed my recommendation for  section. Discussed risk of  section including bleeding, infection, and injury to surrounding organs like bowel, bladder, and ureters. Patient expressed understanding and consented to procedure.     PreOp Diagnosis: 1. IUP ayt 39w4d 2. Labor 3. Breech presentation      PostOp Diagnosis: same      Procedure(s):   SECTION, PRIMARY - Wound Class: Clean Contaminated    Surgeon(s):  Hellen Brdoy D.O.    Anesthesiologist/Type of Anesthesia:  Anesthesiologist: Itz Hooper M.D./Spinal    Surgical Staff:  Circulator: Ania Melendez R.N.  Scrub Person: Sylwia MUÑOZ Circulator Assistant: Nadja Negron R.N.    Specimens removed if any:  Cord gasses    Estimated Blood Loss: 600 cc    Findings: normal appearing uterus, ovaries, and fallopian tubes. Thick meconium. Viable male  delivered in breech presentation without difficulty with Apgars 8, 1, 3, and 6.     Complications: none        8/15/2022 5:20 PM Hellen Brody D.O.

## 2022-08-16 NOTE — LACTATION NOTE
This note was copied from a baby's chart.  Mom is a 27 y/o P1 who delivered baby boy weighing 6 # 10.2 oz at 39.5 wks. Mom reports darker and enlarged areolas during pregnancy. Mom denies any breast surgeries, diabetes, thyroid or fertility issues.   Mom has been pumping routinely for baby in the NICU. Mom is getting about 3-5 mls and is pumping Q2 hours. LC discussed that its Ok to pump  Q 3 hours during the night.   LC reviewed settings on pump with FOB and mother. LC assessed nipples and feels 28.5 would be better for mom. Mom nipples are enlarged and intact.   Lactation to follow up in the morning with parents.

## 2022-08-16 NOTE — PROGRESS NOTES
Patient admitted to the floor. Bleeding was light to moderate with continuous flow, stopped with fundal massage. Patient oriented to the room. Patient was educated to use the pump and hand expression with standard flanges at 32%.

## 2022-08-16 NOTE — OP REPORT
Called to patients room to assess patient. 9 cm dilated and breech presentation noted. Discussed my recommendation for  section. Discussed risk of  section including bleeding, infection, and injury to surrounding organs like bowel, bladder, and ureters. Patient expressed understanding and consented to procedure.     PreOp Diagnosis: 1. IUP at 39w4d 2. Labor 3. Breech presentation      PostOp Diagnosis: same      Procedure(s):   SECTION, PRIMARY - Wound Class: Clean Contaminated    Surgeon(s):  Hellen Brody D.O.    Anesthesiologist/Type of Anesthesia:  Anesthesiologist: Itz Hooper M.D./Spinal    Surgical Staff:  Circulator: Ania Melendez R.N.  Scrub Person: Sylwia Grant  L&PIERRE Circulator Assistant: Nadja Negron R.N.    Specimens removed if any:  Cord gasses and placenta    Estimated Blood Loss: 600 cc    Findings: normal appearing uterus, ovaries, and fallopian tubes. Thick meconium. Viable male  delivered in breech presentation without difficulty with Apgars 8, 1, 3, and 6.     Complications: none        Condition: Mother and baby tolerated procedure well and was taken to the recovery room awake and in stable condition.    Procedure:     The patient was taken to the operating room where spinal anesthesia was found to be adequate.  She was then prepped and draped in the normal sterile fashion in the dorsal supine position with a leftward tilt.  A Pfannenstiel skin incision was then made with a scalpel and carried through to the underlying fascia.  The fascia was incised in the midline and the incision extended laterally with Gorman scissors.  The superior aspect of the fascial incision was then grasped with Magali clamps, elevated, and the underlying rectus muscles dissected off bluntly.  Attention was then turned to the inferior aspect of the fascia which in a similar fashion was grasped, tented up with Magali clamps, and the rectus muscles dissected off bluntly.  Rectus  muscles were then  in the midline and the peritoneum identified tented up and entered sharply with the Metzenbaum scissors.  The peritoneal incision was then extended superiorly and inferiorly with good visualization of the bladder. Mike retractor placed. Bladder flap created using scalpel and blunt digital dissection. The lower uterine segment incised in a transverse fashion with the scalpel.  The uterine incision was then extended cephalad and caudad bluntly. The infant was then delivered atraumatically.  The nose and mouth were suctioned with bulb suction the cord doubly clamped and cut.  The infant was handed off to the waiting pediatricians.  The placenta was then removed with gentle traction and fundal massage.  The uterus was then cleared of all clots and debris.  The uterine incision was repaired with 0 Monocryl in a running locking fashion.  Hysterotomy was found to be hemostatic at this time.  The gutters were cleared of all clots.  The uterus was then returned to the abdomen.  The peritoneum was closed using 2-0 Monocryl. The fascia was reapproximated with 0 Vicryl in a running fashion.  The subcuticular space was closed using a 3-0 Monocryl.  The skin was then closed using a 4-0 Monocryl.  The patient tolerated the procedure well.  Sponge lap and needle counts were correct x2.  500 mg Azithromycin and 2 g of Kefzol were given prior to the procedure.  The patient was taken to the recovery room awake and in stable condition.        [facility]

## 2022-08-16 NOTE — PROGRESS NOTES
1900: Received report from day shift RN. Greeted patient and family. Completed assessment and Dura check. Filled in information on the whiteboard. Described plan of care for patient. Placed call light within reach and notified her to press the call button when needed.

## 2022-08-16 NOTE — PROGRESS NOTES
0700- report received from NOC RN. POC discussed including lochia changes, ambulation, milk expression and delivery to NICU. Medications and other comfort measures discussed. Call light in reach.

## 2022-08-16 NOTE — CARE PLAN
The patient is Stable - Low risk of patient condition declining or worsening    Shift Goals  Clinical Goals: Stable VS, pain control  Patient Goals: Pain control  Family Goals: Bonding    Progress made toward(s) clinical / shift goals:       Problem: Pain - Standard  Goal: Alleviation of pain or a reduction in pain to the patient’s comfort goal  Outcome: Progressing    Pain well controlled with scheduled pain medications     Problem: Knowledge Deficit - Standard  Goal: Patient and family/care givers will demonstrate understanding of plan of care, disease process/condition, diagnostic tests and medications  Outcome: Progressing    POC reviewed, patient verbalized understanding.  Expressing milk to take to NICU.      Problem: Early Mobilization - Post Surgery  Goal: Early mobilization post surgery  Outcome: Progressing    Patient ambulated x3 today.

## 2022-08-16 NOTE — DISCHARGE PLANNING
Discharge Planning Assessment Post Partum    Reason for Referral: NICU  Address: [address]  Type of Living Situation:Stable living with FOB  Mom Diagnosis: Postpartum  Baby Diagnosis: NICU Resp.distress  Primary Language: English     Name of Baby: Ez Adams  Father of the Baby: Juan Pablo Adams  Involved in baby’s care? Yes  Contact Information: [phone]    Prenatal Care: Yes  Mom's PCP: Citlali Chandler  PCP for new baby:[facility]     Support System: MOB stated good support  Coping/Bonding between mother & baby: MOB recovering and plans to visit NICU soon  Source of Feeding: Breastfeeding   Supplies for Infant: MOB stated having all needed supplies    Mom's Insurance: [employer]  Baby Covered on Insurance:MOB will add baby to insurance   Mother Employed/School: [employer]  Other children in the home/names & ages: First    Financial Hardship/Income: None   Mom's Mental status: Stable and alert  Services used prior to admit: None    CPS History: None  Psychiatric History: Hx of depression and anxiety currently on Zoloft  Domestic Violence History: None  Drug/ETOH History: None    Resources Provided:  provided postpartum depression resources   Referrals Made: None     Clearance for Discharge: Baby is cleared to discharge with MOB and FOB when medically cleared     Ongoing Plan: will continue to provide support and resources during admission

## 2022-08-16 NOTE — PROGRESS NOTES
2315 - Pt assisted to dangle, stand, and ambulate to restroom, steady gait, tolerated well, romero catheter removed as per order, chelo care and pad change provided. Education provided about need to void within 6hrs of romero cathter removal, pad changes, and chelo care. Pt assisted to wheelchair and SO took pt to NICU to see infant.    0135 - Pt returned from NICU, steady gait, transferred from wheelchair to bed without difficulty, denies urge to void at this time. SCD's and continuous pulse oximeter reapplied for dura checks, restarted pitocin at 125ml/hr. Medicated for pain as per MAR, pt declines prn medication at this time, call light within reach, will continue to monitor.

## 2022-08-16 NOTE — PROGRESS NOTES
Late entry    On exam, patient had progressed to 8 cm dilation with bulging bag of water.   Following AROM with return of meconium-stained fluid, cervical exam was concerning for breech presentation. Ultrasound was brought to room and breech position was confirmed.    Patient was consented for primary C/S for breech position and surgeon, anesthesiology, and OR staff were mobilized. Patient was transported to OR and underwent primary C/S under spinal anesthesia. Please see operative report for further details.     Zakiya Reyna MD

## 2022-08-17 PROCEDURE — 700102 HCHG RX REV CODE 250 W/ 637 OVERRIDE(OP): Performed by: NURSE PRACTITIONER

## 2022-08-17 PROCEDURE — 770002 HCHG ROOM/CARE - OB PRIVATE (112)

## 2022-08-17 PROCEDURE — A9270 NON-COVERED ITEM OR SERVICE: HCPCS | Performed by: STUDENT IN AN ORGANIZED HEALTH CARE EDUCATION/TRAINING PROGRAM

## 2022-08-17 PROCEDURE — A9270 NON-COVERED ITEM OR SERVICE: HCPCS | Performed by: NURSE PRACTITIONER

## 2022-08-17 PROCEDURE — 700102 HCHG RX REV CODE 250 W/ 637 OVERRIDE(OP): Performed by: STUDENT IN AN ORGANIZED HEALTH CARE EDUCATION/TRAINING PROGRAM

## 2022-08-17 RX ADMIN — SERTRALINE 50 MG: 50 TABLET, FILM COATED ORAL at 18:29

## 2022-08-17 RX ADMIN — IBUPROFEN 800 MG: 800 TABLET, FILM COATED ORAL at 06:40

## 2022-08-17 RX ADMIN — ACETAMINOPHEN 1000 MG: 500 TABLET ORAL at 02:25

## 2022-08-17 RX ADMIN — PRENATAL WITH FERROUS FUM AND FOLIC ACID 1 TABLET: 3080; 920; 120; 400; 22; 1.84; 3; 20; 10; 1; 12; 200; 27; 25; 2 TABLET ORAL at 09:22

## 2022-08-17 RX ADMIN — ACETAMINOPHEN 1000 MG: 500 TABLET ORAL at 09:22

## 2022-08-17 RX ADMIN — IBUPROFEN 800 MG: 800 TABLET, FILM COATED ORAL at 21:39

## 2022-08-17 RX ADMIN — ACETAMINOPHEN 1000 MG: 500 TABLET ORAL at 21:38

## 2022-08-17 RX ADMIN — IBUPROFEN 800 MG: 800 TABLET, FILM COATED ORAL at 13:43

## 2022-08-17 RX ADMIN — ACETAMINOPHEN 1000 MG: 500 TABLET ORAL at 15:01

## 2022-08-17 ASSESSMENT — PAIN DESCRIPTION - PAIN TYPE
TYPE: ACUTE PAIN;SURGICAL PAIN
TYPE: ACUTE PAIN

## 2022-08-17 NOTE — PROGRESS NOTES
"Subjective:  Pt s/p  for breech presentation POD#2. Infant in NICU d/t mec asp s/p extubation. Pt states bleeding decreasing and feels like her period.  Otherwise no complaints at this time.    Pt is eating, ambulating, passing gas, urinating without issue. No BM yet.     No complaints of lightheadedness, dizziness, palpitations, HA, weakness, CP, SOB, at this time. No reports of n/v/c/d.     Pt allergic to miralax.      Objective:    VS: /88   Pulse 95   Temp 36.8 °C (98.3 °F) (Temporal)   Resp 18   Ht 1.626 m (5' 4\")   Wt 80.3 kg (177 lb)   LMP 2021 (Exact Date)   SpO2 97%   Breastfeeding No   BMI 30.38 kg/m²       Physical Exam:  General: well appearing, no apparent distress  Abdomen:soft, nontender, nondistended  Fundus: firm at level below umbilicus  Incision: intact, no discharge, healing well  Perineum: Deferred  Extremities: symmetric, no peripheral edema, calves nontender      A/P:  27yo  s/p  for breech presentation POD#2, c/o decreasing bleeding. No other complaints. Passing gas, no bm yet.     Overall clinical picture is stable pt recovering well. Infant in nicu recovering.     -awaiting bowel movement  -monitor vitals  -encourage ambulation  -encourage hydration  -continue prune juice  "

## 2022-08-17 NOTE — PROGRESS NOTES
0700- report received from NOC RN. POC discussed including feeding intervals, I+O documentation, pumping for NICU, lochia changes, ambulation, infant temperature management including STS and layering, weights, VS intervals, and discharge planning. Medications and other comfort measures discussed. Call light in reach.

## 2022-08-17 NOTE — PROGRESS NOTES
2000: Received report from day shift RN. Greeted patient and family. Completed assessment. Pt requested a stool softener, Colace, to be given. Placed call light within reach and notified the pt to press the button when needed.     2010: Returned Colace, as this medication contains Polyethylene Glycol, which the patient has an allergy to. Encouraged patient to drink prune juice and to continue staying hydrated.     2200: Patient complained of pain, and administered scheduled Ibuprofen to patient. Removed their peripheral IV located at their right wrist, per protocol.

## 2022-08-17 NOTE — CARE PLAN
The patient is Stable - Low risk of patient condition declining or worsening    Shift Goals  Clinical Goals: Patient maintains stable VS. Pain control.  Patient Goals:   Family Goals:     Progress made toward(s) clinical / shift goals:  Patient maintained stable VS throughout the shift. Pain was managed through scheduled pain medication administrations.     Patient is not progressing towards the following goals:

## 2022-08-17 NOTE — LACTATION NOTE
This note was copied from a baby's chart.  Follow up lactation visit to room : Baby has been back transferred to mother's room from NICU.  Order swere give for baby to still have supplements after breast feeding to keep up calories. Mom has a 27 ml bottle available for after attempting nursing. Bedside RN reports that she was able to latch baby on right side for about five minutes. Mom's right nipple is everted. LC switched baby to left side in football hold. Nipple is flat in left side and difficult to roll out, however areola is pliable. LC was able to latch with teacup grasp but baby was unable to maintain suction and latch.   24 mm NS was introduced with instructions for use, cleaning and storing.  Baby latched well and gave several bursts of suckles. Baby was tiring at this time and after about five minutes, LC demonstrated paced bottle feeding while FOB assemble the breast pump parts. Baby took 25 mls via the bottle. LC reviewed timing on the 3 steps and encouraged pumping while partner is bottle feeding baby.   Lactation will follow up in the morning for evaluation of feedings and plan.

## 2022-08-17 NOTE — LACTATION NOTE
This note was copied from a baby's chart.  Lactation Follow Up : Mom continues to pump Q3 hours. Flnages were chnges to 28.5 mm and mom reports the are more comfortable. Milk is increasing in volume today and mom has pumped about 10-20 mls each session.   Mom is continuing to hand express and working on her technique learned from Godfrey video.  Lactation to follow up as needed and before discharge.

## 2022-08-18 VITALS
WEIGHT: 177 LBS | BODY MASS INDEX: 30.22 KG/M2 | HEIGHT: 64 IN | RESPIRATION RATE: 19 BRPM | OXYGEN SATURATION: 99 % | TEMPERATURE: 98.6 F | SYSTOLIC BLOOD PRESSURE: 128 MMHG | DIASTOLIC BLOOD PRESSURE: 83 MMHG | HEART RATE: 82 BPM

## 2022-08-18 PROCEDURE — 700102 HCHG RX REV CODE 250 W/ 637 OVERRIDE(OP): Performed by: STUDENT IN AN ORGANIZED HEALTH CARE EDUCATION/TRAINING PROGRAM

## 2022-08-18 PROCEDURE — A9270 NON-COVERED ITEM OR SERVICE: HCPCS | Performed by: STUDENT IN AN ORGANIZED HEALTH CARE EDUCATION/TRAINING PROGRAM

## 2022-08-18 RX ORDER — ACETAMINOPHEN 500 MG
1000 TABLET ORAL EVERY 6 HOURS
Qty: 30 TABLET | Refills: 0 | Status: SHIPPED | OUTPATIENT
Start: 2022-08-18 | End: 2022-12-07

## 2022-08-18 RX ORDER — PSEUDOEPHEDRINE HCL 30 MG
100 TABLET ORAL 2 TIMES DAILY PRN
Qty: 60 CAPSULE | Refills: 0 | Status: SHIPPED | OUTPATIENT
Start: 2022-08-18 | End: 2022-12-07

## 2022-08-18 RX ORDER — IBUPROFEN 800 MG/1
800 TABLET ORAL EVERY 8 HOURS
Qty: 30 TABLET | Refills: 0 | Status: SHIPPED | OUTPATIENT
Start: 2022-08-18 | End: 2022-12-07

## 2022-08-18 RX ADMIN — IBUPROFEN 800 MG: 800 TABLET, FILM COATED ORAL at 05:51

## 2022-08-18 RX ADMIN — PRENATAL WITH FERROUS FUM AND FOLIC ACID 1 TABLET: 3080; 920; 120; 400; 22; 1.84; 3; 20; 10; 1; 12; 200; 27; 25; 2 TABLET ORAL at 09:28

## 2022-08-18 ASSESSMENT — PAIN DESCRIPTION - PAIN TYPE
TYPE: ACUTE PAIN;SURGICAL PAIN
TYPE: ACUTE PAIN

## 2022-08-18 NOTE — PROGRESS NOTES
0815 Assessment complete. Fundus firm, lochia light. VSS. Tolerating diet. Voiding without difficulty. Incision dressing CDI. Pt educated on dressing protocol with showering. Pt states passing gas. Pt declines pain medication at this time. FOB at bedside, bonding with pt/baby. POC discussed with pt. Encouraged to call with needs. Call light in place.

## 2022-08-18 NOTE — LACTATION NOTE
This note was copied from a baby's chart.  Lactation follow up:    Discharging home today.  Reviewed current 3 step plan.  Assisted mom with latching baby to left breast with shield and baby latched and sucked well for approx 10 minutes before falling asleep.      Reviewed supplemental guidelines.  Reviewed importance of using paced bottle feeding method for giving supplemental feedings.      Mom excited to go home.  Recommended OP lactation to help with weaning baby off supplement and help with getting him to exclusively breastfeed.     OP lactation offered and referral sent to [facility]    Milk Storage guidelines reviewed  How to maximize Milk Production information sheet provided  Supplemental guidelines provided  [facility] Breastfeeding Resources provided and Breastfeeding Support [facility] information given

## 2022-08-18 NOTE — PROGRESS NOTES
1900: Received report from the day shift RN. Greeted patient and family. Patient denies any pain at this time.     2050: Completed assessment. Findings were WDL, except for breast assessment. Findings include inverted left nipple and everted right nipple. Patient performs breastfeeding, bottle feeding, and pumping. Educated patient on breastfeeding techniques. Placed call light within reach and notified her to press the button when needed.

## 2022-08-18 NOTE — DISCHARGE INSTRUCTIONS

## 2022-08-18 NOTE — CARE PLAN
The patient is Stable - Low risk of patient condition declining or worsening    Shift Goals  Clinical Goals: Patient maintains stable VS. Pain control.  Patient Goals:   Family Goals:     Progress made toward(s) clinical / shift goals:  Patient maintained stable VS throughout the shift. Pain was managed through scheduled pain medication administration.     Patient is not progressing towards the following goals:

## 2022-08-18 NOTE — PROGRESS NOTES
Discussed discharge education, and follow up information for infant and MOB. Infant and MOB's bands matched. Cord clamp off. Cuddles removed. Infant placed in car seat by MOB. Checked per RN. Infant and MOB discharged in stable condition.

## 2022-08-18 NOTE — CARE PLAN
The patient is Stable - Low risk of patient condition declining or worsening    Shift Goals  Clinical Goals: Stable vital signs  Patient Goals: Pain control  Family Goals: Bonding    Progress made toward(s) clinical / shift goals:    Problem: Knowledge Deficit - Postpartum  Goal: Patient will verbalize and demonstrate understanding of self and infant care  Outcome: Progressing  Demonstrates good self care.     Problem: Bowel Elimination - Post Surgical  Goal: Patient will resume regular bowel sounds and function with no discomfort or distention  Outcome: Progressing  BM today.

## 2022-08-18 NOTE — DISCHARGE SUMMARY
Discharge Summary:     Date of Admission: 8/15/2022  Date of Discharge: 22      Admitting diagnosis:    1. Pregnancy @ 39w4d    Discharge Diagnosis:   1. Status post  for breech.      Past Medical History:   Diagnosis Date    Anxiety     Chronic fatigue syndrome     Depression     Hyperlipidemia      OB History    Para Term  AB Living   1 1 1     1   SAB IAB Ectopic Molar Multiple Live Births             1      # Outcome Date GA Lbr Gigi/2nd Weight Sex Delivery Anes PTL Lv   1 Term 08/15/22 39w4d   M CS-LTranv Spinal N DENZEL     Past Surgical History:   Procedure Laterality Date    PRIMARY C SECTION N/A 8/15/2022    Procedure:  SECTION, PRIMARY;  Surgeon: Hellen Brody D.O.;  Location: SURGERY LABOR AND DELIVERY;  Service: Obstetrics    DENTAL EXTRACTION(S)      wisdom     Miralax [polyethylene glycol]    Patient Active Problem List   Diagnosis    Major depressive disorder    Anxiety    Dermoid cyst of scalp    Dyslipidemia    Supervision of normal first pregnancy, antepartum    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    possible overriding aorta, referred to [facility], found to be normal.    Labor and delivery, indication for care       Hospital Course:   Pt is a 26 y.o. now  who presented on 8/15/2022 for vaginal deliver. During L&D progression infant found to be breech position, therefore converted to . Single male infant was delivered via c-sec at 8/15 at 1607. Apgars 8, 9 at 1 and 5 minutes respectively.  ml. Infant has been cleared from NICU.     Postpartum course notable for early ambulation, well managed pain, tolerance of diet, spontaneous voiding, and appropriate feeding of infant. Pt had 3 BM. She has remained afebrile and blood pressure has been well controlled. All maternal questions and concerns addressed.     Physical Exam:  Temp:  [36.9 °C (98.5 °F)-37.1 °C (98.7 °F)] 37 °C (98.6 °F)  Pulse:  [73-87] 82  Resp:  [16-19]  19  BP: (116-134)/(83-86) 128/83  SpO2:  [96 %-99 %] 99 %  Physical Exam  General: well appearing, no apparent distress  Abdomen: soft, nontender, nondistended  Fundus: firm at level below umbilicus  Incision: dressing clean, dry, intact  Perineum: Deferred  Extremities: symmetric, no peripheral edema, calves nontender    Current Facility-Administered Medications   Medication Dose    oxytocin (PITOCIN) infusion (for post delivery)  125 mL/hr    lactated ringers infusion      ibuprofen (MOTRIN) tablet 800 mg  800 mg    Followed by    [START ON 8/19/2022] ibuprofen (MOTRIN) tablet 800 mg  800 mg    acetaminophen (TYLENOL) tablet 1,000 mg  1,000 mg    Followed by    [START ON 8/19/2022] acetaminophen (TYLENOL) tablet 1,000 mg  1,000 mg    oxyCODONE immediate-release (ROXICODONE) tablet 5 mg  5 mg    oxyCODONE immediate-release (ROXICODONE) tablet 10 mg  10 mg    ondansetron (ZOFRAN) syringe/vial injection 4 mg  4 mg    Or    ondansetron (ZOFRAN ODT) dispertab 4 mg  4 mg    diphenhydrAMINE (BENADRYL) tablet/capsule 25 mg  25 mg    Or    diphenhydrAMINE (BENADRYL) injection 25 mg  25 mg    docusate sodium (COLACE) capsule 100 mg  100 mg    magnesium hydroxide (MILK OF MAGNESIA) suspension 30 mL  30 mL    prenatal plus vitamin (STUARTNATAL 1+1) 27-1 MG tablet 1 Tablet  1 Tablet    sertraline (Zoloft) tablet 50 mg  50 mg       Recent Labs     08/15/22  1140 08/16/22  0237   WBC 18.4* 21.2*   RBC 4.32 3.44*   HEMOGLOBIN 13.9 11.1*   HEMATOCRIT 40.0 32.0*   MCV 92.6 93.0   MCH 32.2 32.3   MCHC 34.8 34.7   RDW 44.3 44.5   PLATELETCT 244 198   MPV 10.9 10.7         Activity/ Discharge Instructions::   Discharge to home  Pelvic Rest x 6 weeks  No heavy lifting x4 weeks  Call or come to ED for: heavy vaginal bleeding, fever >100.4, severe abdominal pain, severe headache, chest pain, shortness of breath,  N/V, incisional drainage, or other concerns.       Follow up:  [facility] in 5 weeks for vaginal delivery; 1 week  for incision check for  delivery.     Discharge Meds:   Current Outpatient Medications   Medication Sig Dispense Refill    acetaminophen (TYLENOL) 500 MG Tab Take 2 Tablets by mouth every 6 hours. 30 Tablet 0    docusate sodium 100 MG Cap Take 100 mg by mouth 2 times a day as needed for Constipation. 60 Capsule 0    ibuprofen (MOTRIN) 800 MG Tab Take 1 Tablet by mouth every 8 hours. Indications: Joint Damage causing Pain and Loss of Function 30 Tablet 0           Cristian Laguna M.D.

## 2022-08-22 ASSESSMENT — EDINBURGH POSTNATAL DEPRESSION SCALE (EPDS)
I HAVE BEEN ANXIOUS OR WORRIED FOR NO GOOD REASON: YES, SOMETIMES
THINGS HAVE BEEN GETTING ON TOP OF ME: NO, MOST OF THE TIME I HAVE COPED QUITE WELL
I HAVE FELT SAD OR MISERABLE: NOT VERY OFTEN
I HAVE FELT SCARED OR PANICKY FOR NO GOOD REASON: YES, SOMETIMES
I HAVE LOOKED FORWARD WITH ENJOYMENT TO THINGS: AS MUCH AS I EVER DID
I HAVE BEEN SO UNHAPPY THAT I HAVE BEEN CRYING: ONLY OCCASIONALLY
I HAVE BLAMED MYSELF UNNECESSARILY WHEN THINGS WENT WRONG: YES, SOME OF THE TIME
TOTAL SCORE: 10
THE THOUGHT OF HARMING MYSELF HAS OCCURRED TO ME: NEVER
I HAVE BEEN SO UNHAPPY THAT I HAVE HAD DIFFICULTY SLEEPING: NOT VERY OFTEN
I HAVE BEEN ABLE TO LAUGH AND SEE THE FUNNY SIDE OF THINGS: AS MUCH AS I ALWAYS COULD

## 2022-08-24 ENCOUNTER — OFFICE VISIT (OUTPATIENT)
Dept: OBGYN | Facility: CLINIC | Age: 27
End: 2022-08-24
Payer: COMMERCIAL

## 2022-08-24 ENCOUNTER — POST PARTUM (OUTPATIENT)
Dept: OBGYN | Facility: CLINIC | Age: 27
End: 2022-08-24
Payer: COMMERCIAL

## 2022-08-24 VITALS — SYSTOLIC BLOOD PRESSURE: 125 MMHG | WEIGHT: 160 LBS | DIASTOLIC BLOOD PRESSURE: 83 MMHG | BODY MASS INDEX: 27.46 KG/M2

## 2022-08-24 DIAGNOSIS — Z98.891 STATUS POST CESAREAN SECTION: ICD-10-CM

## 2022-08-24 DIAGNOSIS — O92.79 LACTATION DISORDER, POSTPARTUM CONDITION: ICD-10-CM

## 2022-08-24 PROCEDURE — 99024 POSTOP FOLLOW-UP VISIT: CPT | Performed by: OBSTETRICS & GYNECOLOGY

## 2022-08-24 PROCEDURE — 99215 OFFICE O/P EST HI 40 MIN: CPT | Performed by: NURSE PRACTITIONER

## 2022-08-24 ASSESSMENT — FIBROSIS 4 INDEX: FIB4 SCORE: .7713892158398700265

## 2022-08-24 NOTE — PROGRESS NOTES
Summary: Traumatic labor, delivery post partum with ER C/S, baby intubated for Mec Aspiration, NICU stay but able to return to room prior to moms discharge. Pumping had been initiated. Started parents on 3 step plan. Parents very positive but experience real is understood. Mom pumping 6x/day, abundant supply. Baby growing well above birthweight at day 9. Has not been offering the breast as triple feeding is not sustainable.   Today: Offered bare breast with no initiation of suck, applied 24mm NS with milk in it and would suck for the present milk but we could not initiate sucking to induce let down. He does better on the right, much potential. There is no tongue or lip tie although normal frenulums present. Baby is tight, prefers to bite over suck which is expected to resolve over time.   Plan:Supply is being managed will with 6 pumps per day with personal pump, reviewed storage guidelines and maintaining supply. Feeding baby is very intuitive for parents, good growth. Offering the breast 2-3 times per day to evaluate his interest and provide opportunity with NS. Increase offerings as he is more interested.   Follow up:   Lactation appointment :Next week  Pediatrician appointment:2 week [facility]  Referrals :None  Subjective:   Kyleigh Adams is a 26 y.o. female here for lactation care. She is here today with baby Ez (Milind) and  (Ez (Juan Pablo)).    Concerns:   Latch on difficulties , breast refusal , and baby not interested  HPI:   Pertinent  history: c/section after dilating to 9cm, water broken, flipped to breech. Stressed.   Mother does not have a history of advanced maternal age, GDM, hypertension prior to pregnancy, insulin resistance, multiple gestation, PCOS, and thyroid disease. Common condition(s) which may interfere with milk supply.    Breast changes in pregnancy: Yes  History of breast surgeries: No      Other risk factors Traumatic events around birth and the initiation of  breastfeeding    FEEDING HISTORY:    Past breastfeeding history:  First baby   Hospital course: Traumatic labor, delivery post partum with ER C/S, baby intubated for Mec Aspiration, NICU stay but able to return to room prior to moms discharge. Pumping had been initiated. Started parents on 3 step plan. Parents very positive but experience real is understood.  Currently 8/24/2022  Mom pumping 6x/day, abundant supply. Baby growing well above birthweight at day 9. Has not been offering the breast as triple feeding is not sustainable.     Both breasts: No , exclusive bottles right now    Supplement: Supplementation initiated inpatient, Expressed breast milk, and Formula  Quantity: 70ml now  How given/devices:  Bottle  Bottle/nipple type Lansinoh    Nipple Shield Use: None    Breast Pumping:   Frequency: 6x/24 hours  Type of pump: Lansinoh  Flange size/type: 28mm  NO pain with pumping    Maternal ROS:  Constitutional: No fever, chills. Feeling well  Breasts: No soreness of breasts and No soreness of nipples  Psychiatric: Managing ok  Mental Health: No mention of feeling irritable, agitated, angry, overwhelmed, apathy, exhaustion nor having sleep changes outside infant feeds/demands or appetite changes     Objective:     Maternal Physical Lactation Exam  General: No acute distress  Breasts: Asymmetric , Soft, Plugged Duct - no evidence, and Mastitis  - no S/S  Nipples: intact and erythematous  Psychiatric:  Normal mood and affect. Her behavior is normal. Judgment and thought content normal   Mental Health:  Did NOT exhibit sadness, crying, feeling overwhelmed, agitation or hypervigilance. History of MDD    Assessment/Plan & Lactation Counseling:     Infant exam on infant chart    Infant Weight History:   8/15/22 6#10.2oz  8/24/2022 6#10.8oz    Infant intake at Breast:: 0ml     0ml    Total: 0ml  Milk Transfer at this feeding:   Ineffective breastfeeding; not able to transfer a full feed from breast r/t learning  Pumped:  Type of Pump: Personal Lansinoh    Quantity Pumped: L 1oz    R 2oz    Total: 3oz  Initiation of Feeding: Needed to be roused  Position of Feeding:    Right: football  Left: football  Attachment Achieved: rapidly but no sucking  Nipple shield: N/A     Size: 24mm       Introduced in the hospital, reinforeced today   Latch achieved achieved but no milk transferred  Suck Pattern at the breast: No sucking with latch  Suck Pattern on the bottle: Suck burst and normal rest  Behavior Following Observed Feeding: content  Nipple Pain: None     Latch: Assisted latch and Latch difficulty without nipple shield  Suckling/Feeding: attaches, baby falls asleep, and not interested  Sucking strength:  Moderate Strong  Sucking Rhythm Coordinated when milk in the tip of the shield  Compression: biting  Swallowing No difficulty noted  Milk Supply Available: normal - abundant    Maternal Diagnosis/Problem:  Lactation disorder - baby not transferring milk at breast    MATERNAL PERSONALIZED BREASTFEEDING PLAN  Discussed concerns and symptoms as listed above in assessment and guidance summarized below.  Shared decision making was used between myself and the family for this encounter, home care, and follow up.  Topics reviewed included:    4th Trimester: The 12-week period immediately after mom has had the baby. Not everyone has heard of it, but every mother and their  baby will go through it. It is a time of great physical and emotional change as the baby adjusts to being outside the womb, and the family adjusts to new life as parents  During the fourth trimester, one can expect fussiness and crying from the baby and very likely exhaustion for the family. [place] babies are learning to adjust to life outside the womb where it was warm and squishy!  There is a lot of misinformation about babies and their needs, and parents are often encouraged to ignore baby's signals. Bad idea. Babies are “half-baked” at birth and have much to learn  with the help of physical and emotional support from caregivers. Taking care of a baby's needs is an investment that pays off with a happier, healthier child and adult.  It can take weeks or even months for your body to feel totally normal again. There is a major hormonal upheaval experienced by moms in the first few weeks after birth, because their bodies are shifting from many pregnancy hormones to a more normal hormonal make-up.    Mood and Anxiety Disorders: Having a new baby can be joyful time for some new moms, but a difficult time for others. For all, it is a time of profound physical and emotional change. Balancing baby, family, partners and friends, work, pets, and preexisting or new life stressors as well as sleep deprivation can be extremely challenging. Untreated depression, sleep exhaustion, and anxiety are each a challenge that must be addressed and in addition can contribute to early cessation of breastfeeding. IT is important both for the mental health and physical health of new moms and babies to get on the path to feeling better and if therapeutic support is needed, specific resources are available.   Why is breastfeeding so challenging? AAP and Infant Feeding Guidelines recognizes the barriers and challenges that exist, society and healthcare often miss them.    Human milk is optimal nutrition for infants and should be undisputed.    Lactation is a physiological secretion of milk from the breast and  Breastfeeding is a mechanical process of extracting milk from the breast.  The rights of the child include optimal nutrition and mothers need help to make informed decisions, the dyad needs to be screened for biological, psychological, and social risk factors that might interfere with achieving their goals, they need anticipatory guidance, and the barriers and enabling factors should be identified if we want families to follow the recommended infant feeding guidelines. Support is critical.    Triple feeding  (3 step plan) and its sustainability and its impact on the mother baby relationship   Sleep or lack of: Parents have great support and strategies   Self -care through relational support and interaction.   Encouraged  support, rest, getting out of the house each day, walk or drive somewhere,  a coffee/tea at a drive through  Allow others to bring you food, help with chores and errands, meeting for a cup of coffee   The nature of infants oral head/neck structure and function and its impact on latch and transfer of milk.   Discussed Mechanical forces resulting in strain patterns during intrauterine life and during birth may negatively affect the oral-motor function of the  and compromise structure and function.  Joint restriction or hypo-mobility could be a logical progression following the relative lack of mobility during the last crowded months of gestation. An exceptionally flexed or rotated fetal posture may tighten myofascial structures in the neck, restricting the hyoid bone and strap muscles that support an effective swallow. More research reveals the body as an integrated whole via its major structure-maintaining and sensory organ, the fascia.  Other musculoskeletal issues, such as neck preferences, may also affect an infant's ability to nurse. These views have expanded and deepened over time.   Neck preference this is a normal  finding that should correct itself over the next few weeks.    However when there is a neck preference it can make latching more difficult.    Infant head can be supported in the car seat.    Bottle feeding baby's can be encouraged to look to the opposite side of the preference  Infant can be can talked to from the side encouraging baby to turn to.      Milk supply is dependent on glandular tissue development, hormonal influences, how many times the baby removes milk and how well the breasts are emptied in a 24 hour period. This is a biological  reality that we can NOT work around. If, for any reason, your baby is not latching, or you are not able to nurse, then it is important for you to remove the milk instead by pumping or hand expression.  There's no magic trick, tea, food, drink, cookie or supplement that will increase your milk supply. One  must  effectively remove milk to continue to make and maximize milk. In the early days and weeks that can be 8+ times in 24 hours. For older babies, on average 6-7 + times in 24 hours.     Hydration: Staying hydrated is important however lack of hydration is usually not a cause of significantly low milk production.  Everyone needs a different amount of water, depending on their activity and diet. A high salt and/or high-protein diet, high physical activity, or very warm weather/sweating will require more fluids. A person eating a diet high in veggies and fruit, with a lack of physical activity will require fewer fluids. There is no magic number for the # of ounces of water each day.The best way to know that you are well hydrated is by looking at your urine.  Urine should look clear to light pale yellow, and you should need to pee at least every 3 to 4 hours unless you have a large bladder capacity.   Herbs and medications  A galactagogue is an herb or medication taken by a breastfeeding mother to increase her milk supply. We know that for centuries mothers around the world have sought out remedies to increase their milk supply. However, there is limited research on the safety and effectiveness of herbal galactagogues, which makes it hard for us to endorse them. It is not known if any of these herbs are truly effective, and it is difficult to predict how a specific herbal galactagogue will affect an individual, requiring “trial and error” in many situations. When effective, results are generally seen within 24-72 hours of starting an herbal galactagogue. Many of these herbs are used to decrease high blood sugar. If  you are diabetic or have problems with low blood sugar, or thyroid disease you may not be a candidate for herbs. Not all women can increase their low milk supply with a galactagogue due to the many underlying causes of low milk production.  When taking a galactagogue, remember that frequent milk removal is still the most effective way to increase supply.      Feeding:   Infant feeding well given current interval growth, guidelines to follow:  Feed your baby every 1.5-3 hours, more often if baby acts hungry.   Awaken baby for feeding if going over 3 hours in the day.   Daily goal: 8-12 feedings per 24 hours.    Given infants weight you may allow baby to go longer at night but that generally means shorter durations in the day.   Supplement:   Supplement with expressed milk  Bottle feeding well with paced feeding Pacifier Use:  The American Academy of Pediatrics' Position Paper reports: Although we recommend a conservative approach regarding pacifier use, we do not endorse a                         complete ban on the use of pacifiers, nor do we support an approach that induces parental guilt concerning their choices about the use of pacifiers. Pacifier use and breastfeeding  in term and  newborns- a systematic review and meta-analysis from the  J of Pediatrics Published online 2022. Has found that when pacifiers are used among individuals who have been counseled on the risks, they do not interfere with breastfeeding.   Nipple shield (NS): We prefer the 24mm Medela.   Before applying, roll the shield in on itself (like a sombrero, and allow breast to be pulled  in to the shield tip).   The latch is very different from the bare breast, bring the baby to you and let them find the nipple shield and they will manage it, tuck them close once they find it, cheek against breast.   Once you and your baby are familiar with the NS, you may be able to just put it on the breast and latch the baby without  any preparation.    Positioning Techniques for bare breast  Pillow used: [place] [address]  Suggested positions Football  Fine tune position by making sure your fingers beneath the breast as well as your bra, are out of the way of your baby's chin.  Positioning: See http://globalhealthmedia.org/portfolio-items/positions-for-breastfeeding/?vrvsrtgrxSG=12565   Latch on Techniques for bare breast.   Modify for nipple shield use soon enough you will move back to bare breast.  Aim is an asymmetric deep latch.  First make yourself comfortable. Sit with knees bent (unless lying down), feet on a stool if needed. You will support your baby, and pillows will be used to support YOU. You want your baby's belly facing your breast/body (belly to belly). If your baby is extremely fussy and crying, do skin-to-skin for a while until he or she calms down, then try (or try again).   Fine tune latch:  By holding your baby more securely at the breast, assisting your baby to stay attached by:  Support your baby with your hand behind the shoulder blades (NOT THE HEAD).  1. Align your baby's NOSE with your nipple  2. Your baby's chin should be the first part of his or her body to touch your breast  3. Tickle the baby's upper lip or nose with your nipple  4. When your baby's mouth is WIDE OPEN, swiftly bring your baby's mouth over your nipple/areola.  Steps 2 and 3 could be slightly reversed order or essentially happening at the same time.  Bringing your baby to your breast, not breast to the baby  Your baby's cheek to touch breast securely, nose tipped back  Hold your baby firmly in place so when your baby forgets to suck and picks it back up again your baby is in the correct spot. You will be extinguishing behavior and replacing it with a deeper latch to stimulate suck and provide satisfaction at the breast.  Your baby needs as much breast as deep in the mouth as possible to allow your nipples to heal and for you more importantly to  "maximize efficiency at the breast  If that doesn’t help, you should make an appointment with me to re-evaluate.   Latch is asymmetrical, leading with the chin, getting the baby below the breast, as if offering a large \"deli andrew sandwich\".  Here is a video from ANNA on the asymmetric latch- https://www.youtube.com/watch?v=0I-SDg9Ws94   Flexible feeding: A short term solution: Breast/bottle/pump decision, parents were double feeding and not will begin to add breastfeeding.:  FIRST decide what you can do at that feeding and you may decide to double feed -pump and bottle, if you are going to offer the breast at that feeding:  nurse first and limit time on the breasts to 20+/- min total  finish with bottle  pump afterwards to finish emptying your breasts which will help increase milk supply  As baby becomes more effective, evidenced by not pumping much milk after a breastfeeding, we will create a plan to decrease pumping.  Use your own pumped milk, donor human milk, or formula.     Pumping Guidelines:  Both breasts   Pump 6 times in 24 hours  Increase if not freezing milk each day.   BRA    Consider a hands free bra - Simple Wishes is recommended.  Cake Maternity or Milkmaid for larger size bras  Type of pump:  Personal  Always double pump  How long will vary woman to woman, typically 8-15 minutes, or 1-2 minutes after flow slows  Flange Fit: Freely moving nipple in the tunnel with some movement of the areola.  Today's evaluation indicates appropriate flange. Right nipple/areola moves deep into the flange - discussed to monitor for pain.   Caution with breast massage The word “Massage” means different things in the breastfeeding world. It is described and interpreted in so many different ways and unfortunately potentially harmful. The hands can be safe on a breast and  gentle to help in many ways but they also can be damaging when used in the wrong way.  Lymphatic drainage massage is appropriate,  open hand , lightly " stroking only, and can be highly effective. The massage advice to knead , massage deeply , vibrate with marketed tools is  most concerning. Be gentle with this gland to not increase inflammation.     Storage (Acceptable guidelines for healthy term babies)  10 hours at room temp including your pieces, may rinse but not mandatory  8 days refrigerator, don't need  to refrigerate right away if using fresh pumped milk at the next feeding  Freezer 1 year  Deep freezer 2 years  American Academy or Pediatrics has said you may mix different temperature milks.   If baby drinks breastmilk from a fresh or refrigerated bottle of breastmilk,  you may return the unused portion to the refrigerator and use once at next feeding.     Breast Care  plugs (a colloquial term for microscopic ductal inflammation and narrowing)   breastpain (differential) engorgement  dysbiosis/subacute mastitis vasospasm  Breast rest - No Massage, don't overfeed of over pump, down regulate production if needed  Advil 800mg every 8 hours for 48 hours with food  Ice - 10 minutes  after feeding then every 30 minutes or as desired for repeating the ice. Don't put the ice directly on the skin.  Tylenol 1000mg every 8 hours for 48 hours in between the Advil dosing.  Lymphatic drainage massage:  10x each, 4x/day, gently with your fingers (like petting a cat)  location #1 small circles where your breast and armpit meet on each side.  location #2 small circles under the center part of your collar bone on each side  location #3 stroking from the nipple towards the direction of the armpit and collar bone.     Connect with other mothers:  Facebook:   [place] Breast[facility]: [email or web address].[email or web address]  Well-Nourished Babies (Private group for questions and support): https://www.facebook.com/groups/721583576642030/  Breastfeeding [place] [facility]  WEIGHT CHECKS  Tuesdays 10am - 11am. [facility] at [facility], [address] [place] [address], 3rd floor conference room  Check  your baby's weight, do a feeding and see how your baby is growing, visit with other mothers, plan on a walk or coffee date after group.  Please wear a mask coming and going: you may remove it in the classroom  Due to space limitations - limit strollers please (New c/section moms please use your stroller).  We would love to have dads stay, but moms won't breastfeed if there are men in the room, sorry.  The room is generally scheduled for another event following group.  Please take all diapers with you       In Conclusion:   Family present has verbalized what they can realistically do based on family dynamics, understanding a plan has to be doable to be effective and can be renegotiated at any time. Managing breastfeeding and milk supply is very dynamic,can be a complex and intimate journey, and is not one size fits all. When obstacles present themselves, it takes confidence, persistence and support. You are now the focus of our Breastfeeding Medicine team; we are here to support your decisions and vision.     Follow up requires close monitoring in this time sensitive window of opportunity to facilitate the learning of  breastfeeding.    Follow-up for infant weight check and dyad breastfeeding evaluation next week    Please call [phone] if you have not scheduled your next appointment  Family is encouraged to e-mail us to update how the plan is working.    A total of 75 minutes, not including infant assessment time, with more than 50% was spent preparing to see the patient, obtaining and reviewing separately obtained history, performing a medically appropriate examination and evaluation, counseling and educating the family, documenting clinical information in the electronic health record, independently interpreting weighted feeds and infant growth results, communicating these results to the family and care coordination as detailed in the above note.       ARIANNA Bates.

## 2022-08-24 NOTE — PROGRESS NOTES
Incision Check    CC: s/p c/s incision check    HPI: 26 y.o.  s/p  delivery on 8/15/22 with myself for breech presentation here for incision check.   Pt reports doing well with minimal pain.  No fevers.  Denies trouble with urination or BM.  Minimal vaginal bleeding.    BFing which is going well.     Denies concerns about mood.     /83   Wt 72.6 kg (160 lb)   LMP 2021 (Exact Date)   BMI 27.46 kg/m²   Gen: AAO, NAD  Abd: soft, NT, ND, incision C/D/I, healing well    A/P: 26 y.o.  s/p c/s on 8/15/22 doing well  - no signs of postop complications  - encouraged BFing, lactation visit prn  - no signs of PP depression  - contraception: discuss at PP      RTC for routine postpartum visit in approx 3-4wks

## 2022-08-24 NOTE — PROGRESS NOTES
Pt here today for C/s check.  C/s was on 08/15/2022  Currently breast feeding   C/o none  Phone # [phone] (home)   Pharmacy verified.

## 2022-09-20 ASSESSMENT — EDINBURGH POSTNATAL DEPRESSION SCALE (EPDS)
I HAVE BEEN ANXIOUS OR WORRIED FOR NO GOOD REASON: YES, SOMETIMES
THINGS HAVE BEEN GETTING ON TOP OF ME: NO, MOST OF THE TIME I HAVE COPED QUITE WELL
I HAVE LOOKED FORWARD WITH ENJOYMENT TO THINGS: AS MUCH AS I EVER DID
I HAVE BLAMED MYSELF UNNECESSARILY WHEN THINGS WENT WRONG: YES, MOST OF THE TIME
I HAVE FELT SAD OR MISERABLE: NO, NOT AT ALL
TOTAL SCORE: 9
I HAVE FELT SCARED OR PANICKY FOR NO GOOD REASON: YES, SOMETIMES
I HAVE BEEN ABLE TO LAUGH AND SEE THE FUNNY SIDE OF THINGS: AS MUCH AS I ALWAYS COULD
I HAVE BEEN SO UNHAPPY THAT I HAVE BEEN CRYING: ONLY OCCASIONALLY
I HAVE BEEN SO UNHAPPY THAT I HAVE HAD DIFFICULTY SLEEPING: NOT AT ALL
THE THOUGHT OF HARMING MYSELF HAS OCCURRED TO ME: NEVER

## 2022-09-27 ASSESSMENT — EDINBURGH POSTNATAL DEPRESSION SCALE (EPDS)
THINGS HAVE BEEN GETTING ON TOP OF ME: NO, MOST OF THE TIME I HAVE COPED QUITE WELL
I HAVE FELT SCARED OR PANICKY FOR NO GOOD REASON: YES, SOMETIMES
I HAVE FELT SAD OR MISERABLE: NO, NOT AT ALL
THE THOUGHT OF HARMING MYSELF HAS OCCURRED TO ME: NEVER
I HAVE BEEN SO UNHAPPY THAT I HAVE BEEN CRYING: ONLY OCCASIONALLY
I HAVE BEEN ANXIOUS OR WORRIED FOR NO GOOD REASON: YES, SOMETIMES
I HAVE BEEN SO UNHAPPY THAT I HAVE HAD DIFFICULTY SLEEPING: NOT AT ALL
I HAVE BEEN ABLE TO LAUGH AND SEE THE FUNNY SIDE OF THINGS: AS MUCH AS I ALWAYS COULD
TOTAL SCORE: 9
I HAVE BLAMED MYSELF UNNECESSARILY WHEN THINGS WENT WRONG: YES, MOST OF THE TIME
I HAVE LOOKED FORWARD WITH ENJOYMENT TO THINGS: AS MUCH AS I EVER DID

## 2022-09-30 ENCOUNTER — POST PARTUM (OUTPATIENT)
Dept: OBGYN | Facility: CLINIC | Age: 27
End: 2022-09-30
Payer: COMMERCIAL

## 2022-09-30 VITALS — BODY MASS INDEX: 27.64 KG/M2 | WEIGHT: 161 LBS

## 2022-09-30 DIAGNOSIS — Z72.51 UNPROTECTED SEX: ICD-10-CM

## 2022-09-30 PROCEDURE — 0503F POSTPARTUM CARE VISIT: CPT | Performed by: OBSTETRICS & GYNECOLOGY

## 2022-09-30 ASSESSMENT — EDINBURGH POSTNATAL DEPRESSION SCALE (EPDS)
TOTAL SCORE: 9
I HAVE FELT SCARED OR PANICKY FOR NO GOOD REASON: YES, SOMETIMES
I HAVE BEEN SO UNHAPPY THAT I HAVE BEEN CRYING: ONLY OCCASIONALLY
I HAVE LOOKED FORWARD WITH ENJOYMENT TO THINGS: AS MUCH AS I EVER DID
THE THOUGHT OF HARMING MYSELF HAS OCCURRED TO ME: NEVER
I HAVE BEEN ANXIOUS OR WORRIED FOR NO GOOD REASON: YES, SOMETIMES
THINGS HAVE BEEN GETTING ON TOP OF ME: NO, MOST OF THE TIME I HAVE COPED QUITE WELL
I HAVE BEEN SO UNHAPPY THAT I HAVE HAD DIFFICULTY SLEEPING: NOT AT ALL
I HAVE BEEN ABLE TO LAUGH AND SEE THE FUNNY SIDE OF THINGS: AS MUCH AS I ALWAYS COULD
I HAVE BLAMED MYSELF UNNECESSARILY WHEN THINGS WENT WRONG: YES, MOST OF THE TIME
I HAVE FELT SAD OR MISERABLE: NO, NOT AT ALL

## 2022-09-30 ASSESSMENT — FIBROSIS 4 INDEX: FIB4 SCORE: 0.8

## 2022-09-30 NOTE — PROGRESS NOTES
Pt here today for postpartum exam.  Delivery type: c/s on 08/15/2022  Currently :both bottle and breast  Desired BCM: non hormonal options   LMP: not yet  Last pap: 03/02/2022  Phone # [phone] (home)

## 2022-09-30 NOTE — PROGRESS NOTES
Kyleigh Adams is a 27 y.o. female who presents for her Postpartum Exam      HPI: Pt presents for postpartum exam s/p  section on 8/15 for breech position. Patient is without complaints.  Baby doing well.  Breast/bottle feeding.  No depression/anxiety.  Not sexually active.  Lochia resolved.  Desires non hormonal contraception.  She has a history of a classic migraine in the past which occurred a few times and then never again.  EDPS score: 9  Last pap: 3/2/2022    Review of Systems:   Pertinent positives documented in HPI and all other systems reviewed & are negative    Physical exam:   Vital measurements:  Wt 161 lb   Body mass index is 27.64 kg/m². (Goal BMI>18 <25)  Nursing note and vitals reviewed.  Gen: She is oriented to person, place, and time. She appears well-developed and well-nourished. No distress.   Heart: Heart regular rate and rhythm  Lungs: clear to auscultation bilaterally  Breasts: nontender, not engorged, no dominant masses, nipples intact  Abdomen: soft, nontender, nondistended, no rebound/guarding  Extremities: nontender, no clubbing, cyanosis or edema  Pelvic: Normal external female genitalia, well-healed perineum from delivery, cervix is normal, uterus approximately 4 weeks in size non tender, adnexa bilaterally normal with no dominant masses    A/P: Postpartum status post  for breech    #Postpartum  Doing well without complaints  Continue prenatal vitamins if breast feeding  May resume normal activities including exercise, tampons, and intercourse    #Birth control  Discussed various methods of low hormone and low hormone birth control methods.  Discussed barrier methods, copper IUD, Mirena IUD, and progesterone only birth control pills.  Contraception she has decided on the copper IUD.  As she has had unprotected intercourse last night, she will get a beta-hCG and then return in the future for insertion.    Follow up in 1-2 weeks for IUD

## 2022-10-10 ENCOUNTER — HOSPITAL ENCOUNTER (OUTPATIENT)
Dept: LAB | Facility: MEDICAL CENTER | Age: 27
End: 2022-10-10
Attending: OBSTETRICS & GYNECOLOGY
Payer: COMMERCIAL

## 2022-10-10 DIAGNOSIS — Z72.51 UNPROTECTED SEX: ICD-10-CM

## 2022-10-10 LAB — B-HCG SERPL-ACNC: <1 MIU/ML (ref 0–10)

## 2022-10-10 PROCEDURE — 36415 COLL VENOUS BLD VENIPUNCTURE: CPT

## 2022-10-10 PROCEDURE — 84702 CHORIONIC GONADOTROPIN TEST: CPT

## 2022-10-24 ENCOUNTER — GYNECOLOGY VISIT (OUTPATIENT)
Dept: OBGYN | Facility: CLINIC | Age: 27
End: 2022-10-24
Payer: COMMERCIAL

## 2022-10-24 DIAGNOSIS — Z30.430 ENCOUNTER FOR IUD INSERTION: ICD-10-CM

## 2022-10-24 PROCEDURE — 58300 INSERT INTRAUTERINE DEVICE: CPT | Performed by: OBSTETRICS & GYNECOLOGY

## 2022-10-24 RX ORDER — COPPER 313.4 MG/1
1 INTRAUTERINE DEVICE INTRAUTERINE ONCE
Status: COMPLETED | OUTPATIENT
Start: 2022-10-24 | End: 2022-10-24

## 2022-10-24 RX ADMIN — COPPER 1 EACH: 313.4 INTRAUTERINE DEVICE INTRAUTERINE at 08:10

## 2022-10-24 ASSESSMENT — FIBROSIS 4 INDEX: FIB4 SCORE: 0.8

## 2022-10-24 NOTE — PROCEDURES
IUD Insertion    Date/Time: 10/24/2022 8:08 AM  Performed by: Mehnaz Freire D.O.  Authorized by: Mehnaz Freire D.O.     Consent:     Consent obtained:  Verbal    Consent given by:  Patient    Procedure risks and benefits discussed: yes      Patient questions answered: yes    Pre-procedure details:     Negative urine pregnancy test: yes    Procedure:     Pelvic exam performed: yes      Sterile speculum placed in vagina: yes      Cervix visualized: yes      Cervix cleaned and prepped in sterile fashion: yes      Tenaculum applied to cervix: no      Dilation needed: no      Uterus sounded: yes      Uterus sound depth (cm):  7    IUD inserted with no complications: yes      IUD type:  ParaGard    Strings trimmed: yes    Post-procedure:     Patient tolerated procedure well: yes      Patient will follow up after next period: yes

## 2022-11-28 SDOH — ECONOMIC STABILITY: INCOME INSECURITY: IN THE LAST 12 MONTHS, WAS THERE A TIME WHEN YOU WERE NOT ABLE TO PAY THE MORTGAGE OR RENT ON TIME?: NO

## 2022-11-28 SDOH — ECONOMIC STABILITY: INCOME INSECURITY: HOW HARD IS IT FOR YOU TO PAY FOR THE VERY BASICS LIKE FOOD, HOUSING, MEDICAL CARE, AND HEATING?: NOT HARD AT ALL

## 2022-11-28 SDOH — ECONOMIC STABILITY: TRANSPORTATION INSECURITY
IN THE PAST 12 MONTHS, HAS THE LACK OF TRANSPORTATION KEPT YOU FROM MEDICAL APPOINTMENTS OR FROM GETTING MEDICATIONS?: NO

## 2022-11-28 SDOH — ECONOMIC STABILITY: TRANSPORTATION INSECURITY
IN THE PAST 12 MONTHS, HAS LACK OF RELIABLE TRANSPORTATION KEPT YOU FROM MEDICAL APPOINTMENTS, MEETINGS, WORK OR FROM GETTING THINGS NEEDED FOR DAILY LIVING?: NO

## 2022-11-28 SDOH — ECONOMIC STABILITY: FOOD INSECURITY: WITHIN THE PAST 12 MONTHS, YOU WORRIED THAT YOUR FOOD WOULD RUN OUT BEFORE YOU GOT MONEY TO BUY MORE.: NEVER TRUE

## 2022-11-28 SDOH — ECONOMIC STABILITY: HOUSING INSECURITY: IN THE LAST 12 MONTHS, HOW MANY PLACES HAVE YOU LIVED?: 1

## 2022-11-28 SDOH — HEALTH STABILITY: MENTAL HEALTH
STRESS IS WHEN SOMEONE FEELS TENSE, NERVOUS, ANXIOUS, OR CAN'T SLEEP AT NIGHT BECAUSE THEIR MIND IS TROUBLED. HOW STRESSED ARE YOU?: RATHER MUCH

## 2022-11-28 SDOH — ECONOMIC STABILITY: TRANSPORTATION INSECURITY
IN THE PAST 12 MONTHS, HAS LACK OF TRANSPORTATION KEPT YOU FROM MEETINGS, WORK, OR FROM GETTING THINGS NEEDED FOR DAILY LIVING?: NO

## 2022-11-28 SDOH — ECONOMIC STABILITY: HOUSING INSECURITY
IN THE LAST 12 MONTHS, WAS THERE A TIME WHEN YOU DID NOT HAVE A STEADY PLACE TO SLEEP OR SLEPT IN A SHELTER (INCLUDING NOW)?: NO

## 2022-11-28 SDOH — HEALTH STABILITY: PHYSICAL HEALTH: ON AVERAGE, HOW MANY MINUTES DO YOU ENGAGE IN EXERCISE AT THIS LEVEL?: 20 MIN

## 2022-11-28 SDOH — HEALTH STABILITY: PHYSICAL HEALTH: ON AVERAGE, HOW MANY DAYS PER WEEK DO YOU ENGAGE IN MODERATE TO STRENUOUS EXERCISE (LIKE A BRISK WALK)?: 3 DAYS

## 2022-11-28 SDOH — ECONOMIC STABILITY: FOOD INSECURITY: WITHIN THE PAST 12 MONTHS, THE FOOD YOU BOUGHT JUST DIDN'T LAST AND YOU DIDN'T HAVE MONEY TO GET MORE.: NEVER TRUE

## 2022-11-28 ASSESSMENT — LIFESTYLE VARIABLES
HOW OFTEN DO YOU HAVE SIX OR MORE DRINKS ON ONE OCCASION: NEVER
SKIP TO QUESTIONS 9-10: 1
AUDIT-C TOTAL SCORE: 0
HOW OFTEN DO YOU HAVE A DRINK CONTAINING ALCOHOL: NEVER
HOW MANY STANDARD DRINKS CONTAINING ALCOHOL DO YOU HAVE ON A TYPICAL DAY: PATIENT DOES NOT DRINK

## 2022-11-28 ASSESSMENT — SOCIAL DETERMINANTS OF HEALTH (SDOH)
HOW OFTEN DO YOU ATTENT MEETINGS OF THE CLUB OR ORGANIZATION YOU BELONG TO?: NEVER
HOW OFTEN DO YOU ATTEND CHURCH OR RELIGIOUS SERVICES?: NEVER
HOW OFTEN DO YOU HAVE SIX OR MORE DRINKS ON ONE OCCASION: NEVER
WITHIN THE PAST 12 MONTHS, YOU WORRIED THAT YOUR FOOD WOULD RUN OUT BEFORE YOU GOT THE MONEY TO BUY MORE: NEVER TRUE
IN A TYPICAL WEEK, HOW MANY TIMES DO YOU TALK ON THE PHONE WITH FAMILY, FRIENDS, OR NEIGHBORS?: MORE THAN THREE TIMES A WEEK
IN A TYPICAL WEEK, HOW MANY TIMES DO YOU TALK ON THE PHONE WITH FAMILY, FRIENDS, OR NEIGHBORS?: MORE THAN THREE TIMES A WEEK
HOW OFTEN DO YOU HAVE A DRINK CONTAINING ALCOHOL: NEVER
HOW OFTEN DO YOU ATTENT MEETINGS OF THE CLUB OR ORGANIZATION YOU BELONG TO?: NEVER
HOW OFTEN DO YOU GET TOGETHER WITH FRIENDS OR RELATIVES?: ONCE A WEEK
HOW HARD IS IT FOR YOU TO PAY FOR THE VERY BASICS LIKE FOOD, HOUSING, MEDICAL CARE, AND HEATING?: NOT HARD AT ALL
HOW MANY DRINKS CONTAINING ALCOHOL DO YOU HAVE ON A TYPICAL DAY WHEN YOU ARE DRINKING: PATIENT DOES NOT DRINK
DO YOU BELONG TO ANY CLUBS OR ORGANIZATIONS SUCH AS CHURCH GROUPS UNIONS, FRATERNAL OR ATHLETIC GROUPS, OR SCHOOL GROUPS?: NO
HOW OFTEN DO YOU ATTEND CHURCH OR RELIGIOUS SERVICES?: NEVER
DO YOU BELONG TO ANY CLUBS OR ORGANIZATIONS SUCH AS CHURCH GROUPS UNIONS, FRATERNAL OR ATHLETIC GROUPS, OR SCHOOL GROUPS?: NO
HOW OFTEN DO YOU GET TOGETHER WITH FRIENDS OR RELATIVES?: ONCE A WEEK

## 2022-12-01 ENCOUNTER — OFFICE VISIT (OUTPATIENT)
Dept: MEDICAL GROUP | Facility: MEDICAL CENTER | Age: 27
End: 2022-12-01
Payer: COMMERCIAL

## 2022-12-01 DIAGNOSIS — F33.1 MODERATE EPISODE OF RECURRENT MAJOR DEPRESSIVE DISORDER (HCC): ICD-10-CM

## 2022-12-01 DIAGNOSIS — F41.9 ANXIETY: ICD-10-CM

## 2022-12-01 DIAGNOSIS — Z23 IMMUNIZATION DUE: ICD-10-CM

## 2022-12-01 PROCEDURE — 90686 IIV4 VACC NO PRSV 0.5 ML IM: CPT | Performed by: FAMILY MEDICINE

## 2022-12-01 PROCEDURE — 99214 OFFICE O/P EST MOD 30 MIN: CPT | Mod: 25 | Performed by: FAMILY MEDICINE

## 2022-12-01 PROCEDURE — 90471 IMMUNIZATION ADMIN: CPT | Performed by: FAMILY MEDICINE

## 2022-12-01 RX ORDER — SERTRALINE HYDROCHLORIDE 100 MG/1
100 TABLET, FILM COATED ORAL DAILY
Qty: 90 TABLET | Refills: 3 | Status: SHIPPED | OUTPATIENT
Start: 2022-12-01 | End: 2023-11-09

## 2022-12-02 NOTE — PROGRESS NOTES
Kyleigh Adams is a pleasant 27 y.o. female here for depression and anxiety     HPI:   Problem   Post Partum Depression    Increase in anxiety and depression at the end of November around when she got her menstral cycle.  Improved 1 to 2 days after her menstral cycle.  Currently on zoloft 50 mg but felt like she wasn't on anything with this wave of depression.  None is currently 3 and half months old.  Does report increase in stress associated with being a first-time mom.     Anxiety    Currently taking Zoloft 50 mg daily.  A few days associated with her menstrual cycle she had an increase in anxiety depression symptoms.  Since that time she has felt substantial improvement in her symptoms.  Feels that this was more related to her menstrual cycle.  Patient is currently breast-feeding and has a 3-1/2-month old at home.              Current Medicines (including changes today)  Current Outpatient Medications   Medication Sig Dispense Refill    sertraline (ZOLOFT) 100 MG Tab Take 1 Tablet by mouth every day. 90 Tablet 3    acetaminophen (TYLENOL) 500 MG Tab Take 2 Tablets by mouth every 6 hours. (Patient not taking: No sig reported) 30 Tablet 0    docusate sodium 100 MG Cap Take 100 mg by mouth 2 times a day as needed for Constipation. (Patient not taking: No sig reported) 60 Capsule 0    ibuprofen (MOTRIN) 800 MG Tab Take 1 Tablet by mouth every 8 hours. Indications: Joint Damage causing Pain and Loss of Function (Patient not taking: No sig reported) 30 Tablet 0     No current facility-administered medications for this visit.     Past Medical/ Surgical History  She  has a past medical history of Anxiety, Chronic fatigue syndrome, Depression, and Hyperlipidemia.    She has no past medical history of Addisons disease (HCC), Adrenal disorder (HCC), Allergy, Anemia, Arrhythmia, Arthritis, Asthma, Blood transfusion without reported diagnosis, BRCA1 negative, BRCA1 positive, BRCA2 gene mutation positive, BRCA2 negative,  Breast cancer (HCC), Cancer (HCC), Cancer of peritoneum (HCC), Cataract, CHF (congestive heart failure) (HCC), Clotting disorder (HCC), COPD (chronic obstructive pulmonary disease) (HCC), Cushings syndrome (HCC), Diabetes (HCC), Diabetic neuropathy (HCC), GERD (gastroesophageal reflux disease), Glaucoma, Goiter, Head ache, Heart attack (HCC), Heart murmur, HIV (human immunodeficiency virus infection) (HCC), Hypertension, IBD (inflammatory bowel disease), Kidney disease, Malignant neoplasm of fallopian tube (HCC), Meningitis, Migraine, Muscle disorder, Osteoporosis, Ovarian cancer (HCC), Parathyroid disorder (HCC), Pituitary disease (HCC), Pulmonary emphysema (HCC), Seizure (HCC), Sickle cell disease (HCC), Stroke (HCC), Substance abuse (HCC), Thyroid disease, Tuberculosis, or Urinary tract infection.  She  has a past surgical history that includes dental extraction(s) and primary c section (N/A, 8/15/2022).       Objective:     There were no vitals taken for this visit. There is no height or weight on file to calculate BMI.    Physical Exam  Constitutional:       General: She is not in acute distress.  HENT:      Head: Normocephalic and atraumatic.   Eyes:      Conjunctiva/sclera: Conjunctivae normal.      Pupils: Pupils are equal, round, and reactive to light.   Pulmonary:      Effort: Pulmonary effort is normal. No respiratory distress.   Abdominal:      General: There is no distension.   Musculoskeletal:      Cervical back: Normal range of motion and neck supple.   Skin:     General: Skin is warm and dry.      Findings: No rash.   Neurological:      Mental Status: She is alert and oriented to person, place, and time.      Gait: Gait is intact.   Psychiatric:         Mood and Affect: Affect normal.        Imaging:  No imaging    Labs   No Labs  Assessment and Plan:   The following treatment plan was discussed     Problem List Items Addressed This Visit       Major depressive disorder    Relevant Medications     sertraline (ZOLOFT) 100 MG Tab    Anxiety     Chronic, unstable.  Recommended to increase her Zoloft to 100 mg daily.  Discussed with the patient that it could potentially be related to her menstrual cycle and fluctuations in hormones as well as contributing factors such as breast-feeding and the stress associated being a first-time mom.         Relevant Medications    sertraline (ZOLOFT) 100 MG Tab    Post partum depression     Chronic, unstable.  Discussed with the patient that with her fluctuation of hormones, increase in stress associated with first-time mom's this may have contributed to her anxiety and depression.  Went ahead and increased her Zoloft to 100 mg once daily.  We did discuss coming down off this medication when she starts to feel better.  Reports significant provement, denies any suicidal ideation or self-harm.  Denies any homicidal ideation towards her child.  Patient is established a good bond with her child         Relevant Medications    sertraline (ZOLOFT) 100 MG Tab     Other Visit Diagnoses       Immunization due                 Followup: Return in about 6 months (around 6/1/2023), or if symptoms worsen or fail to improve, for Follow-up on depression and anxiety.    I have placed vaccination orders.  The MA is preforming vaccination orders under the direction of Dr. Lafleur    Please note that this dictation was created using voice recognition software. I have made every reasonable attempt to correct obvious errors, but I expect that there are errors of grammar and possibly content that I did not discover before finalizing the note.

## 2022-12-07 ENCOUNTER — GYNECOLOGY VISIT (OUTPATIENT)
Dept: OBGYN | Facility: CLINIC | Age: 27
End: 2022-12-07
Payer: COMMERCIAL

## 2022-12-07 VITALS — BODY MASS INDEX: 25.92 KG/M2 | DIASTOLIC BLOOD PRESSURE: 68 MMHG | SYSTOLIC BLOOD PRESSURE: 108 MMHG | WEIGHT: 151 LBS

## 2022-12-07 DIAGNOSIS — Z30.431 IUD CHECK UP: Primary | ICD-10-CM

## 2022-12-07 PROBLEM — Z30.430 ENCOUNTER FOR IUD INSERTION: Status: RESOLVED | Noted: 2022-10-24 | Resolved: 2022-12-07

## 2022-12-07 PROCEDURE — 99213 OFFICE O/P EST LOW 20 MIN: CPT | Performed by: OBSTETRICS & GYNECOLOGY

## 2022-12-07 ASSESSMENT — FIBROSIS 4 INDEX: FIB4 SCORE: 0.8

## 2022-12-07 NOTE — PROGRESS NOTES
GYN FOLLOW UP VISIT    CC:  Gynecologic Exam (IUD Check )       HPI: Patient is a 27 y.o.  who presents for follow up for IUD check. She had paraguard placed 10/24 by Dr. Freire. She delivered her first baby in August.  She denies complaints or concerns.  Thinks she has had 2 periods since placement, both a little longer than before but denies heavy or painful.  Overall happy.       ROS:   General: denies fever / chills  HEENT: denies sore throat:  CV: denies chest pain:  Repiratory: denies shortness of breath  GI: denies abdominal pain  : denies dysuria:    PFSH:  I personally reviewed the past medical and surgical histories.       PHYSICAL EXAMINATION:  Vital Signs:   Vitals:    22 0908   BP: 108/68   BP Location: Right arm   Patient Position: Sitting   BP Cuff Size: Adult   Weight: 151 lb     Body mass index is 25.92 kg/m².    Gen: appears well, NAD  Respiratory: normal effort  Abdomen: Soft, non-tender.  Pelvic Exam:    Vulva: normal.    Urethra: normal.   Vagina: normal.    Cervix: normal. IUD string noted about 2cm in length    ASSESSMENT AND PLAN:  27 y.o.    1. IUD check up   - IUD in place   - no complaints   - return next year for annual exam      Ana Cuba D.O.

## 2023-02-14 ENCOUNTER — OFFICE VISIT (OUTPATIENT)
Dept: DERMATOLOGY | Facility: IMAGING CENTER | Age: 28
End: 2023-02-14
Payer: COMMERCIAL

## 2023-02-14 DIAGNOSIS — L82.1 SK (SEBORRHEIC KERATOSIS): ICD-10-CM

## 2023-02-14 DIAGNOSIS — Z12.83 SKIN CANCER SCREENING: ICD-10-CM

## 2023-02-14 DIAGNOSIS — D18.01 CHERRY ANGIOMA: ICD-10-CM

## 2023-02-14 DIAGNOSIS — D22.9 MULTIPLE NEVI: ICD-10-CM

## 2023-02-14 DIAGNOSIS — L81.4 LENTIGINES: ICD-10-CM

## 2023-02-14 DIAGNOSIS — D23.9 DERMATOFIBROMA: ICD-10-CM

## 2023-02-14 PROCEDURE — 99213 OFFICE O/P EST LOW 20 MIN: CPT | Performed by: NURSE PRACTITIONER

## 2023-02-14 NOTE — PROGRESS NOTES
DERMATOLOGY NOTE  FOLLOW UP VISIT       Chief complaint: Annual Exam (MARIVEL)    Pt reports new yenny on lt back and lt calf doesn't know if they were there before or if they have changed     History of skin cancer: No  History of precancers/actinic keratoses: No  History of biopsies:No  History of blistering/severe sunburns:Yes, Details: childhood  Family history of skin cancer:Yes, Details: MGF Melanoma  Family history of atypical moles:No    Allergies   Allergen Reactions    Miralax [Polyethylene Glycol] Runny Nose and Itching     Itchy throat, and runny nose     MEDICATIONS:  Medications relevant to specialty reviewed.     REVIEW OF SYSTEMS:   Positive for skin (see HPI)  Negative for fevers and chills    EXAM:  There were no vitals taken for this visit.  Constitutional: Well-developed, well-nourished, and in no distress.     A total body skin exam was performed excluding the genitals per patient preference and including the following areas: head (including face), neck, chest, abdomen, groin/buttocks, back, bilateral upper extremities, and bilateral lower extremities with the following pertinent findings listed below and/or in assessment/plan.     -sun exposed skin of trunk and b/l upper, lower extremities and face with very few scattered clinically benign light brown reticulated macules all of which were morphologically similar and none of which were suspicious for skin cancer today on exam    Very few scattered 1-3mm bright red macules and thin papules on the trunk and extremites    Multiple light brown medium brown skin-colored macules papules scattered over the trunk >> extremities, including lower extremities and face, All with benign-appearing pigment network patterns on dermoscopy    few tan stuck-on waxy papules scattered on the Posterior bilateral lower extremities      IDN right axilla/upper extremity    DF RT pretibial region    IMPRESSION / PLAN:      1. Cherry angioma  - Benign-appearing nature of  lesions discussed during exam.   - Advised to continue to monitor for any return to clinic for new or concerning changes.      2. Lentigines  - Benign-appearing nature of lesions discussed during exam.   - Advised to continue to monitor for any return to clinic for new or concerning changes.      3. Multiple nevi  - Benign-appearing nature of lesions discussed during exam.   - Advised to continue to monitor for any return to clinic for new or concerning changes.  - ABCDE's of melanoma discussed/handout given      4. SK (seborrheic keratosis)  - Benign-appearing nature of lesions discussed during exam.   - Advised to continue to monitor for any return to clinic for new or concerning changes.      5. Dermatofibroma  - Benign-appearing nature of lesions discussed during exam.   - Advised to continue to monitor for any return to clinic for new or concerning changes.      6. Skin cancer screening  Skin cancer education  discussed importance of sun protective clothing, eyewear in addition to the use of broad spectrum sunscreen with SPF 30 or greater, as well as need for reapplication ~every 2 hours when exposed to UVR  discussed importance following up for any new or changing lesions as noted in handout given, but every 12 months exams in clinic in the setting of dermatologic history  ABCDE's of melanoma discussed/handout given      I have performed a physical exam and reviewed and updated ROS and Plan today (2/14/2023). In review of dermatology visit (2/1/2022), there are no changes except as documented above.        Please note that this dictation was created using voice recognition software. I have made every reasonable attempt to correct obvious errors, but I expect that there are errors of grammar and possibly content that I did not discover before finalizing the note.      Return to clinic in: Return in about 1 year (around 2/14/2024) for MARIVEL. and as needed for any new or changing skin lesions.

## 2023-02-28 ENCOUNTER — APPOINTMENT (OUTPATIENT)
Dept: OBGYN | Facility: CLINIC | Age: 28
End: 2023-02-28
Payer: COMMERCIAL

## 2023-06-01 ENCOUNTER — APPOINTMENT (OUTPATIENT)
Dept: MEDICAL GROUP | Facility: MEDICAL CENTER | Age: 28
End: 2023-06-01
Payer: COMMERCIAL

## 2023-06-08 ENCOUNTER — APPOINTMENT (OUTPATIENT)
Dept: MEDICAL GROUP | Facility: MEDICAL CENTER | Age: 28
End: 2023-06-08
Payer: COMMERCIAL

## 2023-06-08 ENCOUNTER — OFFICE VISIT (OUTPATIENT)
Dept: MEDICAL GROUP | Facility: MEDICAL CENTER | Age: 28
End: 2023-06-08
Payer: COMMERCIAL

## 2023-06-08 VITALS
HEART RATE: 86 BPM | TEMPERATURE: 96.9 F | WEIGHT: 148 LBS | DIASTOLIC BLOOD PRESSURE: 52 MMHG | OXYGEN SATURATION: 96 % | HEIGHT: 64 IN | BODY MASS INDEX: 25.27 KG/M2 | SYSTOLIC BLOOD PRESSURE: 98 MMHG | RESPIRATION RATE: 15 BRPM

## 2023-06-08 PROCEDURE — 99213 OFFICE O/P EST LOW 20 MIN: CPT | Performed by: FAMILY MEDICINE

## 2023-06-08 PROCEDURE — 3074F SYST BP LT 130 MM HG: CPT | Performed by: FAMILY MEDICINE

## 2023-06-08 PROCEDURE — 3078F DIAST BP <80 MM HG: CPT | Performed by: FAMILY MEDICINE

## 2023-06-08 ASSESSMENT — PATIENT HEALTH QUESTIONNAIRE - PHQ9: CLINICAL INTERPRETATION OF PHQ2 SCORE: 0

## 2023-06-08 NOTE — PROGRESS NOTES
Kyleigh Adams is a pleasant 27 y.o. female here for   Chief Complaint   Patient presents with    Medication Follow-up     Zoloft       HPI:   Problem   Post Partum Depression    Increase in anxiety and depression at the end of November around when she got her menstral cycle.  Improved 1 to 2 days after her menstral cycle.  Currently on zoloft 100 mg reports a significant improvement in her postpartum depression.          Current Medicines (including changes today)  Current Outpatient Medications   Medication Sig Dispense Refill    sertraline (ZOLOFT) 100 MG Tab Take 1 Tablet by mouth every day. 90 Tablet 3     No current facility-administered medications for this visit.     Past Medical/ Surgical History  She  has a past medical history of Anxiety, Chronic fatigue syndrome, Depression, and Hyperlipidemia.    She has no past medical history of Addisons disease ([facility]), Adrenal disorder (HCC), Allergy, Anemia, Arrhythmia, Arthritis, Asthma, Blood transfusion without reported diagnosis, BRCA1 negative, BRCA1 positive, BRCA2 gene mutation positive, BRCA2 negative, Breast cancer (HCC), Cancer (HCC), Cancer of peritoneum (HCC), Cataract, CHF (congestive heart failure) ([facility]), Clotting disorder ([facility]), COPD (chronic obstructive pulmonary disease) ([facility]), Cushings syndrome ([facility]), Diabetes (HCC), Diabetic neuropathy (HCC), GERD (gastroesophageal reflux disease), Glaucoma, Goiter, Head ache, Heart attack (HCC), Heart murmur, HIV (human immunodeficiency virus infection) ([facility]), Hypertension, IBD (inflammatory bowel disease), Kidney disease, Malignant neoplasm of fallopian tube (HCC), Meningitis, Migraine, Muscle disorder, Osteoporosis, Ovarian cancer (HCC), Parathyroid disorder (HCC), Pituitary disease (HCC), Pulmonary emphysema (HCC), Seizure (HCC), Sickle cell disease (HCC), Stroke (HCC), Substance abuse (HCC), Thyroid disease, Tuberculosis, or Urinary tract infection.  She  has a past surgical history that includes dental  "extraction(s) and primary c section (N/A, 8/15/2022).     Objective:     BP 98/52 (BP Location: Right arm, Patient Position: Sitting, BP Cuff Size: Small adult)   Pulse 86   Temp 36.1 °C (96.9 °F) (Temporal)   Resp 15   Ht 1.626 m (5' 4\")   Wt 67.1 kg (148 lb)   SpO2 96%  Body mass index is 25.4 kg/m².    Physical exam was completed through observation  Physical Exam  Constitutional:       General: She is not in acute distress.  HENT:      Head: Normocephalic and atraumatic.   Eyes:      Conjunctiva/sclera: Conjunctivae normal.      Pupils: Pupils are equal, round, and reactive to light.   Pulmonary:      Effort: Pulmonary effort is normal. No respiratory distress.   Abdominal:      General: There is no distension.   Musculoskeletal:      Cervical back: Normal range of motion and neck supple.   Skin:     General: Skin is warm and dry.      Findings: No rash.   Neurological:      Mental Status: She is alert and oriented to person, place, and time.      Gait: Gait is intact.   Psychiatric:         Mood and Affect: Affect normal.          Imaging:  No new imaging    Labs  No updated labs    Assessment and Plan:   The following treatment plan was discussed     Problem List Items Addressed This Visit       Post partum depression     Chronic, stable.  Continue Zoloft 100 mg daily.  If noticing worsening depression recommended to reach out to the clinic.             Followup: Return if symptoms worsen or fail to improve.      Please note that this dictation was created using voice recognition software. I have made every reasonable attempt to correct obvious errors, but I expect that there are errors of grammar and possibly content that I did not discover before finalizing the note.         "

## 2023-06-08 NOTE — ASSESSMENT & PLAN NOTE
Chronic, stable.  Continue Zoloft 100 mg daily.  If noticing worsening depression recommended to reach out to the clinic.

## 2023-07-05 ENCOUNTER — OFFICE VISIT (OUTPATIENT)
Dept: URGENT CARE | Facility: CLINIC | Age: 28
End: 2023-07-05
Payer: COMMERCIAL

## 2023-07-05 VITALS
SYSTOLIC BLOOD PRESSURE: 98 MMHG | WEIGHT: 147 LBS | DIASTOLIC BLOOD PRESSURE: 66 MMHG | TEMPERATURE: 99.1 F | RESPIRATION RATE: 14 BRPM | OXYGEN SATURATION: 98 % | HEIGHT: 64 IN | BODY MASS INDEX: 25.1 KG/M2 | HEART RATE: 76 BPM

## 2023-07-05 DIAGNOSIS — R51.9 NONINTRACTABLE HEADACHE, UNSPECIFIED CHRONICITY PATTERN, UNSPECIFIED HEADACHE TYPE: ICD-10-CM

## 2023-07-05 PROCEDURE — 3078F DIAST BP <80 MM HG: CPT | Performed by: REGISTERED NURSE

## 2023-07-05 PROCEDURE — 3074F SYST BP LT 130 MM HG: CPT | Performed by: REGISTERED NURSE

## 2023-07-05 PROCEDURE — 99213 OFFICE O/P EST LOW 20 MIN: CPT | Performed by: REGISTERED NURSE

## 2023-07-05 RX ORDER — KETOROLAC TROMETHAMINE 30 MG/ML
30 INJECTION, SOLUTION INTRAMUSCULAR; INTRAVENOUS ONCE
Status: COMPLETED | OUTPATIENT
Start: 2023-07-05 | End: 2023-07-05

## 2023-07-05 RX ADMIN — KETOROLAC TROMETHAMINE 30 MG: 30 INJECTION, SOLUTION INTRAMUSCULAR; INTRAVENOUS at 11:25

## 2023-07-05 ASSESSMENT — ENCOUNTER SYMPTOMS
SHORTNESS OF BREATH: 0
DIARRHEA: 0
HEADACHES: 1
FEVER: 0
NAUSEA: 1
MYALGIAS: 0
NECK PAIN: 0
CHILLS: 0
ABDOMINAL PAIN: 0
SORE THROAT: 0
VOMITING: 0
LOSS OF CONSCIOUSNESS: 0
DIZZINESS: 0
SEIZURES: 0

## 2023-07-05 NOTE — LETTER
July 5, 2023         Patient: Kyleigh Adams   YOB: 1995   Date of Visit: 7/5/2023           To Whom it May Concern:    Kyleigh Adams was seen in my clinic on 7/5/2023. She may return to work on 07/06/2023.    If you have any questions or concerns, please don't hesitate to call.        Sincerely,           AMADO Chester  Electronically Signed

## 2023-09-11 ENCOUNTER — HOSPITAL ENCOUNTER (OUTPATIENT)
Dept: RADIOLOGY | Facility: MEDICAL CENTER | Age: 28
End: 2023-09-11
Attending: OBSTETRICS & GYNECOLOGY
Payer: COMMERCIAL

## 2023-09-11 DIAGNOSIS — R10.13 EPIGASTRIC PAIN: ICD-10-CM

## 2023-09-11 PROCEDURE — 76700 US EXAM ABDOM COMPLETE: CPT

## 2023-11-07 DIAGNOSIS — F41.9 ANXIETY: ICD-10-CM

## 2023-11-07 DIAGNOSIS — F33.1 MODERATE EPISODE OF RECURRENT MAJOR DEPRESSIVE DISORDER (HCC): ICD-10-CM

## 2023-11-09 RX ORDER — SERTRALINE HYDROCHLORIDE 100 MG/1
100 TABLET, FILM COATED ORAL DAILY
Qty: 90 TABLET | Refills: 3 | Status: SHIPPED | OUTPATIENT
Start: 2023-11-09

## 2024-02-14 ENCOUNTER — OFFICE VISIT (OUTPATIENT)
Dept: DERMATOLOGY | Facility: IMAGING CENTER | Age: 29
End: 2024-02-14
Payer: COMMERCIAL

## 2024-02-14 DIAGNOSIS — L81.4 LENTIGINES: ICD-10-CM

## 2024-02-14 DIAGNOSIS — D23.9 DERMATOFIBROMA: ICD-10-CM

## 2024-02-14 DIAGNOSIS — Z12.83 SKIN CANCER SCREENING: ICD-10-CM

## 2024-02-14 DIAGNOSIS — D22.9 MULTIPLE NEVI: ICD-10-CM

## 2024-02-14 DIAGNOSIS — D18.01 CHERRY ANGIOMA: ICD-10-CM

## 2024-02-14 DIAGNOSIS — L82.1 SK (SEBORRHEIC KERATOSIS): ICD-10-CM

## 2024-02-14 PROCEDURE — 99212 OFFICE O/P EST SF 10 MIN: CPT | Performed by: NURSE PRACTITIONER

## 2024-02-14 NOTE — PROGRESS NOTES
DERMATOLOGY NOTE  FOLLOW UP VISIT       Chief complaint: Follow-Up    MARIVEL , no new concerns today     History of skin cancer: No  History of precancers/actinic keratoses: No  History of biopsies:No  History of blistering/severe sunburns:Yes, Details: childhood  Family history of skin cancer:Yes, Details: MGF Melanoma  Family history of atypical moles:No    Allergies   Allergen Reactions    Miralax [Polyethylene Glycol] Runny Nose and Itching     Itchy throat, and runny nose     MEDICATIONS:  Medications relevant to specialty reviewed.     REVIEW OF SYSTEMS:   Positive for skin (see HPI)  Negative for fevers and chills    EXAM:  There were no vitals taken for this visit.  Constitutional: Well-developed, well-nourished, and in no distress.     A total body skin exam was performed excluding the genitals per patient preference and including the following areas: head (including face), neck, chest, abdomen, groin/buttocks, back, bilateral upper extremities, and bilateral lower extremities with the following pertinent findings listed below and/or in assessment/plan.     -sun exposed skin of trunk and b/l upper, lower extremities and face with very few scattered clinically benign light brown reticulated macules all of which were morphologically similar and none of which were suspicious for skin cancer today on exam    Very few scattered 1-3mm bright red macules and thin papules on the trunk and extremites    Multiple light brown medium brown skin-colored macules papules scattered over the trunk >> extremities, including lower extremities and face, All with benign-appearing pigment network patterns on dermoscopy    few tan stuck-on waxy papules scattered on the Posterior bilateral lower extremities      IDN right axilla/upper extremity    DF RT pretibial region, left posterior shoulder       IMPRESSION / PLAN:      1. Cherry angioma  - Benign-appearing nature of lesions discussed during exam.   - Advised to continue to  monitor for any return to clinic for new or concerning changes.      2. Lentigines  - Benign-appearing nature of lesions discussed during exam.   - Advised to continue to monitor for any return to clinic for new or concerning changes.      3. Multiple nevi  - Benign-appearing nature of lesions discussed during exam.   - Advised to continue to monitor for any return to clinic for new or concerning changes.  - ABCDE's of melanoma discussed/handout given      4. SK (seborrheic keratosis)  - Benign-appearing nature of lesions discussed during exam.   - Advised to continue to monitor for any return to clinic for new or concerning changes.      5. Dermatofibroma  - Benign-appearing nature of lesions discussed during exam.   - Advised to continue to monitor for any return to clinic for new or concerning changes.      6. Skin cancer screening  Skin cancer education  discussed importance of sun protective clothing, eyewear in addition to the use of broad spectrum sunscreen with SPF 30 or greater, as well as need for reapplication ~every 2 hours when exposed to UVR  discussed importance following up for any new or changing lesions as noted in handout given, but every 12 months exams in clinic in the setting of dermatologic history  ABCDE's of melanoma discussed/handout given      I have performed a physical exam and reviewed and updated ROS and Plan today (2/14/2024). In review of dermatology visit (2/14/2023), there are no changes except as documented above.        Please note that this dictation was created using voice recognition software. I have made every reasonable attempt to correct obvious errors, but I expect that there are errors of grammar and possibly content that I did not discover before finalizing the note.      Return to clinic in: Return in about 1 year (around 2/14/2025) for MARIVEL. and as needed for any new or changing skin lesions.

## 2024-03-18 NOTE — ASSESSMENT & PLAN NOTE
Chronic, unstable.  Discussed with the patient that with her fluctuation of hormones, increase in stress associated with first-time mom's this may have contributed to her anxiety and depression.  Went ahead and increased her Zoloft to 100 mg once daily.  We did discuss coming down off this medication when she starts to feel better.  Reports significant provement, denies any suicidal ideation or self-harm.  Denies any homicidal ideation towards her child.  Patient is established a good bond with her child  
Chronic, unstable.  Recommended to increase her Zoloft to 100 mg daily.  Discussed with the patient that it could potentially be related to her menstrual cycle and fluctuations in hormones as well as contributing factors such as breast-feeding and the stress associated being a first-time mom.  
18-Mar-2024 14:15

## 2024-05-24 ENCOUNTER — OFFICE VISIT (OUTPATIENT)
Dept: URGENT CARE | Facility: CLINIC | Age: 29
End: 2024-05-24
Payer: COMMERCIAL

## 2024-05-24 VITALS
OXYGEN SATURATION: 96 % | SYSTOLIC BLOOD PRESSURE: 118 MMHG | WEIGHT: 156 LBS | TEMPERATURE: 98.7 F | BODY MASS INDEX: 26.63 KG/M2 | HEART RATE: 80 BPM | DIASTOLIC BLOOD PRESSURE: 70 MMHG | RESPIRATION RATE: 16 BRPM | HEIGHT: 64 IN

## 2024-05-24 DIAGNOSIS — H60.501 ACUTE OTITIS EXTERNA OF RIGHT EAR, UNSPECIFIED TYPE: ICD-10-CM

## 2024-05-24 DIAGNOSIS — H65.91 FLUID LEVEL BEHIND TYMPANIC MEMBRANE OF RIGHT EAR: ICD-10-CM

## 2024-05-24 DIAGNOSIS — H69.93 DYSFUNCTION OF BOTH EUSTACHIAN TUBES: ICD-10-CM

## 2024-05-24 PROCEDURE — 99213 OFFICE O/P EST LOW 20 MIN: CPT | Performed by: PHYSICIAN ASSISTANT

## 2024-05-24 PROCEDURE — 3074F SYST BP LT 130 MM HG: CPT | Performed by: PHYSICIAN ASSISTANT

## 2024-05-24 PROCEDURE — 3078F DIAST BP <80 MM HG: CPT | Performed by: PHYSICIAN ASSISTANT

## 2024-05-24 RX ORDER — CETIRIZINE HYDROCHLORIDE 10 MG/1
10 TABLET ORAL DAILY
Qty: 30 TABLET | Refills: 0 | Status: SHIPPED | OUTPATIENT
Start: 2024-05-24

## 2024-05-24 RX ORDER — FLUTICASONE PROPIONATE 50 MCG
1 SPRAY, SUSPENSION (ML) NASAL DAILY
Qty: 15.8 ML | Refills: 0 | Status: SHIPPED | OUTPATIENT
Start: 2024-05-24

## 2024-05-24 ASSESSMENT — ENCOUNTER SYMPTOMS
SORE THROAT: 0
VOMITING: 0
NAUSEA: 0
COUGH: 0
CHILLS: 0
DIZZINESS: 0
FEVER: 0

## 2024-05-24 NOTE — LETTER
May 24, 2024         Patient: Kyleigh Adams   YOB: 1995   Date of Visit: 5/24/2024           To Whom it May Concern:    Kyleigh Adams was seen in my clinic on 5/24/2024.       Sincerely,           Jacinta Buck P.A.-C.  Electronically Signed

## 2024-05-24 NOTE — PROGRESS NOTES
Subjective     Kyleigh Adams is a 28 y.o. female who presents with Ear Pain (X 7 days, itchy Rt ear, last night pain started.)    HPI:  Kyleigh Adams is a 28 y.o. female who presents today for evaluation of right ear pain.  Patient has noticed some itching in her right ear for the past week or so.  She says that she has not really taken anything for it.  Last night, ever, it started to become painful as well if she notices a little bit of pain just underneath her ear also.  She has not taken anything for the pain.  Denies any recent illness or travel.  Does state that she has been suffering for some bouts of environmental and seasonal allergies over the past few weeks.  She is all started to notice some mild itching in her left ear starting over the past 24 hours.  No fever/chills, congestion, sore throat, dizziness, or discharge from the ear.        Review of Systems   Constitutional:  Negative for chills and fever.   HENT:  Positive for ear pain. Negative for congestion and sore throat.         Ear itching   Respiratory:  Negative for cough.    Gastrointestinal:  Negative for nausea and vomiting.   Neurological:  Negative for dizziness.         PMH:  has a past medical history of Anxiety, Chronic fatigue syndrome, Depression, and Hyperlipidemia.    She has no past medical history of Addisons disease (HCC), Adrenal disorder (HCC), Allergy, Anemia, Arrhythmia, Arthritis, Asthma, Blood transfusion without reported diagnosis, BRCA1 negative, BRCA1 positive, BRCA2 gene mutation positive, BRCA2 negative, Breast cancer (HCC), Cancer (HCC), Cancer of peritoneum (HCC), Cataract, CHF (congestive heart failure) (HCC), Clotting disorder (HCC), COPD (chronic obstructive pulmonary disease) (HCC), Cushings syndrome (HCC), Diabetes (HCC), Diabetic neuropathy (HCC), GERD (gastroesophageal reflux disease), Glaucoma, Goiter, Head ache, Heart attack (HCC), Heart murmur, HIV (human immunodeficiency virus infection) ([facility]),  "Hypertension, IBD (inflammatory bowel disease), Kidney disease, Malignant neoplasm of fallopian tube (HCC), Meningitis, Migraine, Muscle disorder, Osteoporosis, Ovarian cancer (HCC), Parathyroid disorder (HCC), Pituitary disease (HCC), Pulmonary emphysema (HCC), Seizure (HCC), Sickle cell disease (HCC), Stroke (HCC), Substance abuse (HCC), Thyroid disease, Tuberculosis, or Urinary tract infection.  MEDS:   Current Outpatient Medications:     neomycin sulf/polymyx B sulf/HC soln (CORTISPORIN HC SOL) 3.5-45157-6 Solution, Administer 3 Drops into the right ear 4 times a day for 7 days., Disp: 10 mL, Rfl: 0    cetirizine (ZYRTEC) 10 MG Tab, Take 1 Tablet by mouth every day., Disp: 30 Tablet, Rfl: 0    fluticasone (FLONASE) 50 MCG/ACT nasal spray, Administer 1 Spray into affected nostril(S) every day., Disp: 15.8 mL, Rfl: 0    sertraline (ZOLOFT) 100 MG Tab, TAKE 1 TABLET DAILY (Patient not taking: Reported on 2024), Disp: 90 Tablet, Rfl: 3  ALLERGIES:   Allergies   Allergen Reactions    Miralax [Polyethylene Glycol] Runny Nose and Itching     Itchy throat, and runny nose     SURGHX:   Past Surgical History:   Procedure Laterality Date    PRIMARY C SECTION N/A 8/15/2022    Procedure:  SECTION, PRIMARY;  Surgeon: Hellen Brody D.O.;  Location: [facility];  Service: Obstetrics    DENTAL EXTRACTION(S)      wisdom     SOCHX:  reports that she has never smoked. She has never used smokeless tobacco. She reports that she does not currently use alcohol. She reports that she does not use drugs.  FH: Family history was reviewed, no pertinent findings to report      Objective     /70   Pulse 80   Temp 37.1 °C (98.7 °F) (Temporal)   Resp 16   Ht 1.626 m (5' 4\")   Wt 70.8 kg (156 lb)   SpO2 96%   BMI 26.78 kg/m²      Physical Exam  Constitutional:       General: She is not in acute distress.     Appearance: She is not diaphoretic.   HENT:      Head: Normocephalic and atraumatic.      " Right Ear: External ear normal. Tenderness present. A middle ear effusion is present. Tympanic membrane is not perforated or erythematous.      Left Ear: Tympanic membrane, ear canal and external ear normal.      Ears:      Comments: Right ear with small fluid level noted behind the TM.  There is some mild tenderness of the ear canal with some very slight swelling and erythema extending to the external ear and tragus.  No mastoid tenderness.  Eyes:      Conjunctiva/sclera: Conjunctivae normal.      Pupils: Pupils are equal, round, and reactive to light.   Pulmonary:      Effort: Pulmonary effort is normal. No respiratory distress.   Musculoskeletal:      Cervical back: Normal range of motion.   Lymphadenopathy:      Head:      Right side of head: Posterior auricular adenopathy present.   Skin:     Findings: No rash.   Neurological:      Mental Status: She is alert and oriented to person, place, and time.   Psychiatric:         Mood and Affect: Mood and affect normal.         Cognition and Memory: Memory normal.         Judgment: Judgment normal.             Assessment & Plan       1. Fluid level behind tympanic membrane of right ear  - cetirizine (ZYRTEC) 10 MG Tab; Take 1 Tablet by mouth every day.  Dispense: 30 Tablet; Refill: 0  - fluticasone (FLONASE) 50 MCG/ACT nasal spray; Administer 1 Spray into affected nostril(S) every day.  Dispense: 15.8 mL; Refill: 0    2. Dysfunction of both eustachian tubes  - cetirizine (ZYRTEC) 10 MG Tab; Take 1 Tablet by mouth every day.  Dispense: 30 Tablet; Refill: 0  - fluticasone (FLONASE) 50 MCG/ACT nasal spray; Administer 1 Spray into affected nostril(S) every day.  Dispense: 15.8 mL; Refill: 0    3. Acute otitis externa of right ear, unspecified type  - neomycin sulf/polymyx B sulf/HC soln (CORTISPORIN HC SOL) 3.5-41332-0 Solution; Administer 3 Drops into the right ear 4 times a day for 7 days.  Dispense: 10 mL; Refill: 0    Patient with small fluid level and symptom  suggestive of eustachian tube dysfunction but was also noted to have some slight erythema and swelling/tenderness of her right external ear as well.  We will treat the external ear canal with Cortisporin HC.  I have also prescribed once daily cetirizine and once daily fluticasone nasal spray to use for the next 7 to 14 days to help with the ear discomfort and fullness.  Also discussed that she can take OTC analgesics to help with pain.  If symptoms do not start to significantly improve after 3 to 4 days I would recommend that she return to the urgent care for reevaluation.          Differential Diagnosis, natural history, and supportive care discussed. Return to the Urgent Care or follow up with your PCP if symptoms fail to resolve, or for any new or worsening symptoms. Emergency room precautions discussed. Patient and/or family appears understanding of information.

## 2024-06-08 SDOH — ECONOMIC STABILITY: FOOD INSECURITY: WITHIN THE PAST 12 MONTHS, YOU WORRIED THAT YOUR FOOD WOULD RUN OUT BEFORE YOU GOT MONEY TO BUY MORE.: NEVER TRUE

## 2024-06-08 SDOH — HEALTH STABILITY: PHYSICAL HEALTH: ON AVERAGE, HOW MANY DAYS PER WEEK DO YOU ENGAGE IN MODERATE TO STRENUOUS EXERCISE (LIKE A BRISK WALK)?: 3 DAYS

## 2024-06-08 SDOH — ECONOMIC STABILITY: HOUSING INSECURITY: IN THE LAST 12 MONTHS, HOW MANY PLACES HAVE YOU LIVED?: 1

## 2024-06-08 SDOH — HEALTH STABILITY: PHYSICAL HEALTH: ON AVERAGE, HOW MANY MINUTES DO YOU ENGAGE IN EXERCISE AT THIS LEVEL?: 40 MIN

## 2024-06-08 SDOH — ECONOMIC STABILITY: INCOME INSECURITY: IN THE LAST 12 MONTHS, WAS THERE A TIME WHEN YOU WERE NOT ABLE TO PAY THE MORTGAGE OR RENT ON TIME?: NO

## 2024-06-08 SDOH — HEALTH STABILITY: MENTAL HEALTH
STRESS IS WHEN SOMEONE FEELS TENSE, NERVOUS, ANXIOUS, OR CAN'T SLEEP AT NIGHT BECAUSE THEIR MIND IS TROUBLED. HOW STRESSED ARE YOU?: ONLY A LITTLE

## 2024-06-08 SDOH — ECONOMIC STABILITY: INCOME INSECURITY: HOW HARD IS IT FOR YOU TO PAY FOR THE VERY BASICS LIKE FOOD, HOUSING, MEDICAL CARE, AND HEATING?: NOT HARD AT ALL

## 2024-06-08 SDOH — ECONOMIC STABILITY: FOOD INSECURITY: WITHIN THE PAST 12 MONTHS, THE FOOD YOU BOUGHT JUST DIDN'T LAST AND YOU DIDN'T HAVE MONEY TO GET MORE.: NEVER TRUE

## 2024-06-08 ASSESSMENT — SOCIAL DETERMINANTS OF HEALTH (SDOH)
WITHIN THE PAST 12 MONTHS, YOU WORRIED THAT YOUR FOOD WOULD RUN OUT BEFORE YOU GOT THE MONEY TO BUY MORE: NEVER TRUE
HOW OFTEN DO YOU ATTENT MEETINGS OF THE CLUB OR ORGANIZATION YOU BELONG TO?: NEVER
HOW OFTEN DO YOU ATTEND CHURCH OR RELIGIOUS SERVICES?: NEVER
HOW OFTEN DO YOU HAVE A DRINK CONTAINING ALCOHOL: MONTHLY OR LESS
HOW OFTEN DO YOU GET TOGETHER WITH FRIENDS OR RELATIVES?: ONCE A WEEK
HOW OFTEN DO YOU ATTEND CHURCH OR RELIGIOUS SERVICES?: NEVER
IN A TYPICAL WEEK, HOW MANY TIMES DO YOU TALK ON THE PHONE WITH FAMILY, FRIENDS, OR NEIGHBORS?: MORE THAN THREE TIMES A WEEK
HOW MANY DRINKS CONTAINING ALCOHOL DO YOU HAVE ON A TYPICAL DAY WHEN YOU ARE DRINKING: 3 OR 4
HOW OFTEN DO YOU ATTENT MEETINGS OF THE CLUB OR ORGANIZATION YOU BELONG TO?: NEVER
DO YOU BELONG TO ANY CLUBS OR ORGANIZATIONS SUCH AS CHURCH GROUPS UNIONS, FRATERNAL OR ATHLETIC GROUPS, OR SCHOOL GROUPS?: NO
HOW OFTEN DO YOU HAVE SIX OR MORE DRINKS ON ONE OCCASION: NEVER
DO YOU BELONG TO ANY CLUBS OR ORGANIZATIONS SUCH AS CHURCH GROUPS UNIONS, FRATERNAL OR ATHLETIC GROUPS, OR SCHOOL GROUPS?: NO
HOW OFTEN DO YOU GET TOGETHER WITH FRIENDS OR RELATIVES?: ONCE A WEEK
HOW HARD IS IT FOR YOU TO PAY FOR THE VERY BASICS LIKE FOOD, HOUSING, MEDICAL CARE, AND HEATING?: NOT HARD AT ALL
IN A TYPICAL WEEK, HOW MANY TIMES DO YOU TALK ON THE PHONE WITH FAMILY, FRIENDS, OR NEIGHBORS?: MORE THAN THREE TIMES A WEEK

## 2024-06-08 ASSESSMENT — LIFESTYLE VARIABLES
AUDIT-C TOTAL SCORE: 2
SKIP TO QUESTIONS 9-10: 0
HOW MANY STANDARD DRINKS CONTAINING ALCOHOL DO YOU HAVE ON A TYPICAL DAY: 3 OR 4
HOW OFTEN DO YOU HAVE SIX OR MORE DRINKS ON ONE OCCASION: NEVER
HOW OFTEN DO YOU HAVE A DRINK CONTAINING ALCOHOL: MONTHLY OR LESS

## 2024-06-10 ENCOUNTER — OFFICE VISIT (OUTPATIENT)
Dept: MEDICAL GROUP | Facility: MEDICAL CENTER | Age: 29
End: 2024-06-10
Payer: COMMERCIAL

## 2024-06-10 VITALS
TEMPERATURE: 98.2 F | HEIGHT: 64 IN | SYSTOLIC BLOOD PRESSURE: 114 MMHG | OXYGEN SATURATION: 96 % | WEIGHT: 160.94 LBS | HEART RATE: 97 BPM | BODY MASS INDEX: 27.48 KG/M2 | DIASTOLIC BLOOD PRESSURE: 70 MMHG

## 2024-06-10 DIAGNOSIS — Z00.00 ENCOUNTER FOR PREVENTATIVE ADULT HEALTH CARE EXAMINATION: Primary | ICD-10-CM

## 2024-06-10 DIAGNOSIS — M79.89 SWELLING OF LEFT FOOT: ICD-10-CM

## 2024-06-10 PROCEDURE — 3078F DIAST BP <80 MM HG: CPT | Performed by: FAMILY MEDICINE

## 2024-06-10 PROCEDURE — 3074F SYST BP LT 130 MM HG: CPT | Performed by: FAMILY MEDICINE

## 2024-06-10 PROCEDURE — 99395 PREV VISIT EST AGE 18-39: CPT | Performed by: FAMILY MEDICINE

## 2024-06-10 PROCEDURE — 99213 OFFICE O/P EST LOW 20 MIN: CPT | Mod: 25 | Performed by: FAMILY MEDICINE

## 2024-06-10 ASSESSMENT — PATIENT HEALTH QUESTIONNAIRE - PHQ9
1. LITTLE INTEREST OR PLEASURE IN DOING THINGS: NOT AT ALL
SUM OF ALL RESPONSES TO PHQ9 QUESTIONS 1 AND 2: 0
2. FEELING DOWN, DEPRESSED, IRRITABLE, OR HOPELESS: NOT AT ALL

## 2024-06-10 NOTE — PROGRESS NOTES
Verbal consent was acquired by the patient to use [employer] ambient listening note generation during this visit Yes    Subjective:     CC:   Chief Complaint   Patient presents with    Annual Exam    Foot Swelling     Left foot, pain        HPI:   Kyleigh Adams is a 28 y.o. female who presents for annual exam    History of Present Illness  The patient is a 28-year-old female who is here for annual preventative exam as well as left foot pain and swelling.    The patient's gynecologist is Dr. Freire. She is  1, para 1, with a history of  section. Her last Pap smear was conducted in 2023, with no history of abnormal results. Her last menstrual period commenced on 05/15/2023, and she currently has an implant. She has no history of sexually transmitted diseases, although she tested positive for Chlamydia in the past. Her menstrual cycles are regular, lasting approximately 7 days, with heavy bleeding for 3 days followed by light bleeding for the remaining half. She experiences mild cramping leading up to her menstrual cycle, for which she takes Midol. She denies any urinary incontinence. Her daily multivitamin and magnesium supplements are taken, and she denies any significant dietary restrictions. She engages in regular physical activity, walking approximately 3 miles during her lunch hour. She denies any concerns about substance abuse and is in a monogamous relationship. She consistently wears a seatbelt in the car, applies sunscreen, and brushes her teeth daily. She has received the varicella vaccine and is unsure if she has received the hepatitis B vaccine. She received 2 HPV vaccines in high school and recently received the most recent one. She denies any feelings of depression or hopelessness in the past 2 weeks.    The patient's left foot is more swollen than her right foot, which began a few days ago. She has been experiencing intermittent pain in the top of her foot for the past 1 to 2  months. She describes the pain as akin to wearing a tight sock with a rotary surface on the inside and back. The pain has since improved, but she can not point her foot beyond a certain point without pain. The pain radiates up to her shin. She denies any recent falls, injuries, or rolling her ankle. She denies any pain in the bottom of her foot or ankle. She denies any popping. Her hips have been hurting during walking. The swelling worsens as the day goes on, but she notices it when she wakes up.    Cancer screening  Colorectal Cancer Screening: Due at 45 years old  Lung Cancer Screening: Non-smoker   Breast Cancer Screening: Due at 40 years old  Hep C screenin2022    Ob-Gyn/ History:    Patient has GYN provider: yes, David Bhagatst  /Para:    Last Pap Smear: 2023. No history of abnormal pap smears.  Gyn Surgery: .  LMP: 05/15/2024  Hx STDs: No  Birth control: implant  Menses every month with 7 days with light, heavy bleeding.  Reports mild cramping and does take OTC analgesics for cramps.  No significant bloating/fluid retention, pelvic pain, or dyspareunia. No abnormal vaginal discharge.  No breast tenderness, mass, nipple discharge, changes in size or contour, or abnormal cyclic discomfort.  Urinary incontinence: No  Folate intake: daily multi with magnesium     Health Maintenance  Advanced directive: N/A  Osteoporosis Screen/ DEXA: Due at 65 years old  PT/vit D for falls prevention: N/A   Cholesterol Screenin2021 non-HDL cholesterol 122  Diabetes Screenin2021 glucose 90  Aspirin Use: Not indicated     Diet: Regular   Exercise: Walks.     Substance Abuse: No concerns   Safe in relationship.   Seat belts, bike helmet, gun safety discussed.  Sun protection used.  Routine Dental: Daily brushing and follows with a dentist     Immunizations  Influenza: Out of season   HPV series: Due for second dose   Tetanus: 2022   Shingles: Due at 50 years old   COVID: Updated on  booster  Pneumococcal : No risk factors, due at 65 years old  Hep B: Due for series  Other immunizations: None    OB History    Para Term  AB Living   1 1 1 0 0 1   SAB IAB Ectopic Molar Multiple Live Births   0 0 0 0 0 1      She  reports being sexually active and has had partner(s) who are male. She reports using the following method of birth control/protection: I.U.D..    She  has a past medical history of Allergy, Anxiety, Chronic fatigue syndrome, Depression, Hyperlipidemia, and Migraine.    She has no past medical history of Addisons disease (HCC), Adrenal disorder (HCC), Anemia, Arrhythmia, Arthritis, Asthma, Blood transfusion without reported diagnosis, BRCA1 negative, BRCA1 positive, BRCA2 gene mutation positive, BRCA2 negative, Breast cancer (HCC), Cancer (HCC), Cancer of peritoneum (HCC), Cataract, CHF (congestive heart failure) (HCC), Clotting disorder (HCC), COPD (chronic obstructive pulmonary disease) (HCC), Cushings syndrome (HCC), Diabetes (HCC), Diabetic neuropathy (HCC), GERD (gastroesophageal reflux disease), Glaucoma, Goiter, Head ache, Heart attack (HCC), Heart murmur, HIV (human immunodeficiency virus infection) (HCC), Hypertension, IBD (inflammatory bowel disease), Kidney disease, Malignant neoplasm of fallopian tube (HCC), Meningitis, Muscle disorder, Osteoporosis, Ovarian cancer (HCC), Parathyroid disorder (HCC), Pituitary disease (HCC), Pulmonary emphysema (HCC), Seizure (HCC), Sickle cell disease (HCC), Stroke (HCC), Substance abuse (HCC), Thyroid disease, Tuberculosis, or Urinary tract infection.  She  has a past surgical history that includes dental extraction(s) and primary c section (N/A, 08/15/2022).    Family History   Problem Relation Age of Onset    Thyroid Mother     Hypertension Father         Now is under control from diet and exercise. But used to be a big problem.    Thyroid Father     Cancer Maternal Grandfather         Melanoma skin cancer    Alcohol abuse  "Maternal Grandfather     Cancer Paternal Grandfather         non-hod lymphoma    Alcohol abuse Maternal Grandmother     Stroke Paternal Grandmother     Alcohol abuse Sister      Social History     Tobacco Use    Smoking status: Never    Smokeless tobacco: Never   Vaping Use    Vaping status: Never Used   Substance Use Topics    Alcohol use: Not Currently     Comment: Not often, rarely enough to become inebriated    Drug use: Never       Patient Active Problem List    Diagnosis Date Noted    Swelling of left foot 06/10/2024    Post partum depression 12/01/2022    Encounter for preventative adult health care examination 10/24/2022    possible overriding aorta, referred to [facility], found to be normal. 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 03/02/2022    Anxiety 11/11/2021    Dermoid cyst of scalp 11/11/2021    Dyslipidemia 11/11/2021    Major depressive disorder 08/26/2020     Current Outpatient Medications   Medication Sig Dispense Refill    cetirizine (ZYRTEC) 10 MG Tab Take 1 Tablet by mouth every day. 30 Tablet 0    fluticasone (FLONASE) 50 MCG/ACT nasal spray Administer 1 Spray into affected nostril(S) every day. 15.8 mL 0     No current facility-administered medications for this visit.     Allergies   Allergen Reactions    Miralax [Polyethylene Glycol] Runny Nose and Itching     Itchy throat, and runny nose         Objective:   /70   Pulse 97   Temp 36.8 °C (98.2 °F)   Ht 1.626 m (5' 4\")   Wt 73 kg (160 lb 15 oz)   SpO2 96%   BMI 27.62 kg/m²     Wt Readings from Last 4 Encounters:   06/10/24 73 kg (160 lb 15 oz)   05/24/24 70.8 kg (156 lb)   07/05/23 66.7 kg (147 lb)   06/08/23 67.1 kg (148 lb)     Physical Exam  Constitutional:       General: She is not in acute distress.  HENT:      Head: Normocephalic and atraumatic.      Right Ear: Tympanic membrane and external ear normal.      Left Ear: Tympanic " membrane and external ear normal.      Nose: No nasal deformity.      Mouth/Throat:      Lips: Pink.      Mouth: Mucous membranes are moist.      Pharynx: Oropharynx is clear. Uvula midline. No posterior oropharyngeal erythema.   Eyes:      General: Lids are normal.      Extraocular Movements: Extraocular movements intact.      Conjunctiva/sclera: Conjunctivae normal.      Pupils: Pupils are equal, round, and reactive to light.   Neck:      Trachea: Trachea normal.   Cardiovascular:      Rate and Rhythm: Normal rate and regular rhythm.      Heart sounds: Normal heart sounds. No murmur heard.     No friction rub. No gallop.   Pulmonary:      Effort: Pulmonary effort is normal. No accessory muscle usage.      Breath sounds: Normal breath sounds. No wheezing or rales.   Abdominal:      General: Bowel sounds are normal.      Palpations: Abdomen is soft.      Tenderness: There is no abdominal tenderness.   Musculoskeletal:      Cervical back: Normal range of motion and neck supple.      Right lower leg: No edema.      Left lower leg: No edema.      Left foot: Swelling present. No deformity, foot drop or tenderness ([place]).   Lymphadenopathy:      Cervical: No cervical adenopathy.   Skin:     General: Skin is warm and dry.      Findings: No rash.   Neurological:      General: No focal deficit present.      Mental Status: She is alert and oriented to person, place, and time. Mental status is at baseline.      GCS: GCS eye subscore is 4. GCS verbal subscore is 5. GCS motor subscore is 6.      Motor: No weakness.      Gait: Gait is intact.   Psychiatric:         Attention and Perception: Attention normal.         Mood and Affect: Mood and affect normal.         Speech: Speech normal.           Assessment and Plan:        Assessment & Plan  1. Preventative health exam.  Anticipatory guidance:  --Discussed moderation in sodium/caffeine intake, saturated fat and cholesterol, caloric balance, sufficient fresh fruits/vegetables,  fiber, iron, and 0.4-0.8mg of folate supplement per day (for females capable of pregnancy).  --Discussed brushing, flossing, and dental visits.   --Encouraged regular exercise, 150 mins a week of moderate to high intensity cardiovascular activity.   --Discussed tobacco, alcohol, or other drug use; availability of treatment for abuse.   --Discussed sexually transmitted infections, partner selection, use of condoms, avoidance of unintended pregnancy and contraceptive alternatives.  --Injury prevention: Discussed safety belts, safety helmets, smoke detector, etc.  - Discussed vaccinations but they are due for  -Discussed  breast self exam, diet and exercise     Health maintenance: Up-to-date  Patient counseled about skin care, diet, supplements, and exercise.    2. Subacute left foot swelling, with no obvious bruising. A left foot x-ray will be obtained. The patient is advised to take ibuprofen 600 mg up to 3 times a day as needed for foot discomfort. Elevation of the foot is recommended to alleviate the swelling. Should there be no improvement in her symptoms or if the condition worsens, a referral to orthopedics will be considered.      Follow-up  Follow-up visits will be scheduled as necessary.  Kyleigh was seen today for annual exam and foot swelling.    Diagnoses and all orders for this visit:    Encounter for preventative adult health care examination    Swelling of left foot  -     DX-FOOT-COMPLETE 3+ LEFT; Future         Follow-up: Return in about 1 year (around 6/10/2025) for Annual.       Educated on annual wellness visit information that is covered by their insurance during these exams.  Patient informed that they may receive an additional charge for any acute complaints that are brought up during the annual wellness visit.  Patient acknowledges advisement.    Please note that this dictation was created using voice recognition software. I have made every reasonable attempt to correct obvious errors, but I  expect that there are errors of grammar and possibly content that I did not discover before finalizing the note.

## 2024-06-11 ENCOUNTER — PATIENT MESSAGE (OUTPATIENT)
Dept: MEDICAL GROUP | Facility: MEDICAL CENTER | Age: 29
End: 2024-06-11
Payer: COMMERCIAL

## 2024-06-11 RX ORDER — SERTRALINE HYDROCHLORIDE 100 MG/1
100 TABLET, FILM COATED ORAL DAILY
COMMUNITY
End: 2024-06-11 | Stop reason: SDUPTHER

## 2024-06-11 RX ORDER — SERTRALINE HYDROCHLORIDE 100 MG/1
100 TABLET, FILM COATED ORAL DAILY
Qty: 90 TABLET | Refills: 3 | Status: SHIPPED | OUTPATIENT
Start: 2024-06-11

## 2024-06-14 ENCOUNTER — HOSPITAL ENCOUNTER (OUTPATIENT)
Dept: RADIOLOGY | Facility: MEDICAL CENTER | Age: 29
End: 2024-06-14
Attending: FAMILY MEDICINE
Payer: COMMERCIAL

## 2024-06-14 DIAGNOSIS — M79.89 SWELLING OF LEFT FOOT: ICD-10-CM

## 2024-06-14 PROCEDURE — 73630 X-RAY EXAM OF FOOT: CPT | Mod: LT

## 2024-09-04 ENCOUNTER — OFFICE VISIT (OUTPATIENT)
Dept: MEDICAL GROUP | Facility: MEDICAL CENTER | Age: 29
End: 2024-09-04
Payer: COMMERCIAL

## 2024-09-04 VITALS
WEIGHT: 154 LBS | OXYGEN SATURATION: 98 % | SYSTOLIC BLOOD PRESSURE: 98 MMHG | HEART RATE: 105 BPM | HEIGHT: 64 IN | DIASTOLIC BLOOD PRESSURE: 60 MMHG | TEMPERATURE: 98.4 F | BODY MASS INDEX: 26.29 KG/M2

## 2024-09-04 DIAGNOSIS — J06.9 VIRAL URI WITH COUGH: ICD-10-CM

## 2024-09-04 DIAGNOSIS — J02.9 SORE THROAT: ICD-10-CM

## 2024-09-04 DIAGNOSIS — J06.9 UPPER RESPIRATORY TRACT INFECTION, UNSPECIFIED TYPE: ICD-10-CM

## 2024-09-04 LAB
FLUAV RNA SPEC QL NAA+PROBE: NEGATIVE
FLUBV RNA SPEC QL NAA+PROBE: NEGATIVE
RSV RNA SPEC QL NAA+PROBE: NEGATIVE
S PYO DNA SPEC NAA+PROBE: NOT DETECTED
SARS-COV-2 RNA RESP QL NAA+PROBE: NEGATIVE

## 2024-09-04 PROCEDURE — 99214 OFFICE O/P EST MOD 30 MIN: CPT | Performed by: NURSE PRACTITIONER

## 2024-09-04 PROCEDURE — 0241U POCT CEPHEID COV-2, FLU A/B, RSV - PCR: CPT | Performed by: NURSE PRACTITIONER

## 2024-09-04 PROCEDURE — 3074F SYST BP LT 130 MM HG: CPT | Performed by: NURSE PRACTITIONER

## 2024-09-04 PROCEDURE — 3078F DIAST BP <80 MM HG: CPT | Performed by: NURSE PRACTITIONER

## 2024-09-04 PROCEDURE — 87651 STREP A DNA AMP PROBE: CPT | Performed by: NURSE PRACTITIONER

## 2024-09-04 RX ORDER — FLUTICASONE PROPIONATE 50 MCG
1 SPRAY, SUSPENSION (ML) NASAL DAILY
Qty: 16 G | Refills: 0 | Status: SHIPPED | OUTPATIENT
Start: 2024-09-04

## 2024-09-04 RX ORDER — AZITHROMYCIN 250 MG/1
TABLET, FILM COATED ORAL
Qty: 6 TABLET | Refills: 0 | Status: SHIPPED | OUTPATIENT
Start: 2024-09-04 | End: 2024-09-09

## 2024-09-04 NOTE — PROGRESS NOTES
Subjective:     History of Present Illness  The patient presents for evaluation of a viral upper respiratory infection.    She began experiencing symptoms on 08/28/2024, which included green mucus, headache, sore throat, and a crusted eye upon waking. She also reported body aches and alternating chills and hot flashes starting yesterday, but did not measure her temperature. Over-the-counter medications such as Sudafed and Mucinex were used, providing some relief but not completely alleviating the symptoms.    She described a sensation of pressure in her ears and sinuses, likening it to her head being filled with cotton and her ears being blocked, making it difficult to hear. She also felt as if something was lodged in her sinuses. Attempts to alleviate these symptoms with saline spray were made, but no rinses were used.    Her sore throat was particularly painful at night, and she had a persistent cough that produced green phlegm. Her voice became scratchy from last Wednesday to Saturday, and her symptoms worsened on Sunday.    She reports no shortness of breath or chest pain and has no history of asthma or lung problems. Neither her  nor son were tested for strep or COVID-19. She has a history of occasional sinus infections, approximately once a year.      Current medicines (including changes today)  Current Outpatient Medications   Medication Sig Dispense Refill    sertraline (ZOLOFT) 100 MG Tab Take 1 Tablet by mouth every day. 90 Tablet 3    cetirizine (ZYRTEC) 10 MG Tab Take 1 Tablet by mouth every day. (Patient not taking: Reported on 9/4/2024) 30 Tablet 0    fluticasone (FLONASE) 50 MCG/ACT nasal spray Administer 1 Spray into affected nostril(S) every day. (Patient not taking: Reported on 9/4/2024) 15.8 mL 0     No current facility-administered medications for this visit.     She  has a past medical history of Allergy, Anxiety, Chronic fatigue syndrome, Depression, Hyperlipidemia, and Migraine.    She  "has no past medical history of Addisons disease (HCC), Adrenal disorder (HCC), Anemia, Arrhythmia, Arthritis, Asthma, Blood transfusion without reported diagnosis, BRCA1 negative, BRCA1 positive, BRCA2 gene mutation positive, BRCA2 negative, Breast cancer (HCC), Cancer (HCC), Cancer of peritoneum (HCC), Cataract, CHF (congestive heart failure) (HCC), Clotting disorder (HCC), COPD (chronic obstructive pulmonary disease) (HCC), Cushings syndrome (HCC), Diabetes (HCC), Diabetic neuropathy (HCC), GERD (gastroesophageal reflux disease), Glaucoma, Goiter, Head ache, Heart attack (HCC), Heart murmur, HIV (human immunodeficiency virus infection) (HCC), Hypertension, IBD (inflammatory bowel disease), Kidney disease, Malignant neoplasm of fallopian tube (HCC), Meningitis, Muscle disorder, Osteoporosis, Ovarian cancer (HCC), Parathyroid disorder (HCC), Pituitary disease (HCC), Pulmonary emphysema (HCC), Seizure (HCC), Sickle cell disease (HCC), Stroke (HCC), Substance abuse (HCC), Thyroid disease, Tuberculosis, or Urinary tract infection.    ROS   No chest pain, no shortness of breath, no abdominal pain  Positive ROS as per HPI.  All other systems reviewed and are negative.     Objective:     BP 98/60   Pulse (!) 105   Temp 36.9 °C (98.4 °F) (Temporal)   Ht 1.626 m (5' 4\")   Wt 69.9 kg (154 lb)   SpO2 98%  Body mass index is 26.43 kg/m².     Physical Exam    Constitutional: Alert, no distress.  Skin: Warm, dry, good turgor, no rashes in visible areas.  Eye: Equal, round and reactive, conjunctiva clear, lids normal.  ENMT: Lips without lesions, good dentition, oropharynx clear.  Neck: Trachea midline, no masses, no thyromegaly. No cervical or supraclavicular lymphadenopathy  Respiratory: Unlabored respiratory effort, lungs clear to auscultation, no wheezes, no ronchi.  Cardiovascular: Normal S1, S2, no murmur, no edema.  Abdomen: Soft, non-tender, no masses, no hepatosplenomegaly.  Psych: Alert and oriented x3, normal " affect and mood.      Results  Laboratory Studies  Covid, influenza and RSV tests are negative. Strep test is negative.        Assessment and Plan:   The following treatment plan was discussed    Assessment & Plan  1. Upper Respiratory Tract Infection.  Her condition is unstable. Tests for COVID-19, influenza, and RSV have returned negative results, as has the strep test. Supportive care and medication management for viral infections were discussed. It was explained that if symptoms persist beyond 14 days, a bacterial infection may have developed, necessitating antibiotics. Given the severity of her symptoms and their recent escalation, a prescription for an antibiotic and a nasal steroid will be sent to her pharmacy for pickup. She was advised against taking the antibiotic unless absolutely necessary, a point she understood. Over-the-counter daytime and nighttime cold and sinus medications, as well as daily Flonase, were recommended. Additional supportive care was also suggested.          ORDERS:  1. URI with cough  - POCT Cepheid CoV-2, Flu A/B, RSV - PCR  - POCT Cepheid Group A Strep - PCR    2. Sore throat  - POCT Cepheid Group A Strep - PCR    3. Upper respiratory tract infection, unspecified type  - azithromycin (ZITHROMAX) 250 MG Tab; 2 tabs by mouth day 1, 1 tab by mouth days 2-5  Dispense: 6 Tablet; Refill: 0  - fluticasone (FLONASE) 50 MCG/ACT nasal spray; Administer 1 Spray into affected nostril(S) every day.  Dispense: 16 g; Refill: 0            The MA is performing the above orders under the direction of Dr. Ramos    Please note that this dictation was created using voice recognition software. I have made every reasonable attempt to correct obvious errors, but I expect that there are errors of grammar and possibly content that I did not discover before finalizing the note.      Attestation      Verbal consent was acquired by the patient to use [employer] ambient listening note generation during this  visit Yes

## 2024-09-04 NOTE — LETTER
Kyleigh Adams had an appointment with us today 9/4/2024. Please excuse from work today. If you questions please call us.        Thank you,         ARIANNA Grove.  Electronically Signed

## 2024-09-05 ENCOUNTER — APPOINTMENT (OUTPATIENT)
Dept: MEDICAL GROUP | Facility: MEDICAL CENTER | Age: 29
End: 2024-09-05
Payer: COMMERCIAL

## 2024-10-03 ENCOUNTER — APPOINTMENT (OUTPATIENT)
Dept: RADIOLOGY | Facility: MEDICAL CENTER | Age: 29
End: 2024-10-03
Attending: EMERGENCY MEDICINE
Payer: COMMERCIAL

## 2024-10-03 ENCOUNTER — HOSPITAL ENCOUNTER (EMERGENCY)
Facility: MEDICAL CENTER | Age: 29
End: 2024-10-03
Attending: EMERGENCY MEDICINE
Payer: COMMERCIAL

## 2024-10-03 VITALS
WEIGHT: 154.83 LBS | HEIGHT: 64 IN | BODY MASS INDEX: 26.43 KG/M2 | TEMPERATURE: 98.3 F | RESPIRATION RATE: 16 BRPM | OXYGEN SATURATION: 99 % | HEART RATE: 90 BPM | DIASTOLIC BLOOD PRESSURE: 64 MMHG | SYSTOLIC BLOOD PRESSURE: 110 MMHG

## 2024-10-03 DIAGNOSIS — Z34.90 EARLY STAGE OF PREGNANCY: ICD-10-CM

## 2024-10-03 DIAGNOSIS — N83.202 LEFT OVARIAN CYST: ICD-10-CM

## 2024-10-03 DIAGNOSIS — Z97.5 IUD (INTRAUTERINE DEVICE) IN PLACE: ICD-10-CM

## 2024-10-03 LAB
ALBUMIN SERPL BCP-MCNC: 4.1 G/DL (ref 3.2–4.9)
ALBUMIN/GLOB SERPL: 1.2 G/DL
ALP SERPL-CCNC: 74 U/L (ref 30–99)
ALT SERPL-CCNC: 11 U/L (ref 2–50)
ANION GAP SERPL CALC-SCNC: 13 MMOL/L (ref 7–16)
APPEARANCE UR: CLEAR
AST SERPL-CCNC: 17 U/L (ref 12–45)
B-HCG SERPL-ACNC: ABNORMAL MIU/ML (ref 0–10)
BASOPHILS # BLD AUTO: 0.5 % (ref 0–1.8)
BASOPHILS # BLD: 0.04 K/UL (ref 0–0.12)
BILIRUB SERPL-MCNC: 0.6 MG/DL (ref 0.1–1.5)
BILIRUB UR QL STRIP.AUTO: NEGATIVE
BUN SERPL-MCNC: 7 MG/DL (ref 8–22)
CALCIUM ALBUM COR SERPL-MCNC: 9.2 MG/DL (ref 8.5–10.5)
CALCIUM SERPL-MCNC: 9.3 MG/DL (ref 8.4–10.2)
CHLORIDE SERPL-SCNC: 104 MMOL/L (ref 96–112)
CO2 SERPL-SCNC: 20 MMOL/L (ref 20–33)
COLOR UR: YELLOW
CREAT SERPL-MCNC: 0.58 MG/DL (ref 0.5–1.4)
EOSINOPHIL # BLD AUTO: 0.13 K/UL (ref 0–0.51)
EOSINOPHIL NFR BLD: 1.6 % (ref 0–6.9)
ERYTHROCYTE [DISTWIDTH] IN BLOOD BY AUTOMATED COUNT: 42 FL (ref 35.9–50)
GFR SERPLBLD CREATININE-BSD FMLA CKD-EPI: 125 ML/MIN/1.73 M 2
GLOBULIN SER CALC-MCNC: 3.4 G/DL (ref 1.9–3.5)
GLUCOSE SERPL-MCNC: 82 MG/DL (ref 65–99)
GLUCOSE UR STRIP.AUTO-MCNC: NEGATIVE MG/DL
HCG SERPL QL: POSITIVE
HCT VFR BLD AUTO: 41 % (ref 37–47)
HGB BLD-MCNC: 13.5 G/DL (ref 12–16)
IMM GRANULOCYTES # BLD AUTO: 0.02 K/UL (ref 0–0.11)
IMM GRANULOCYTES NFR BLD AUTO: 0.2 % (ref 0–0.9)
KETONES UR STRIP.AUTO-MCNC: NEGATIVE MG/DL
LEUKOCYTE ESTERASE UR QL STRIP.AUTO: NEGATIVE
LYMPHOCYTES # BLD AUTO: 2.45 K/UL (ref 1–4.8)
LYMPHOCYTES NFR BLD: 30.5 % (ref 22–41)
MCH RBC QN AUTO: 31.5 PG (ref 27–33)
MCHC RBC AUTO-ENTMCNC: 32.9 G/DL (ref 32.2–35.5)
MCV RBC AUTO: 95.6 FL (ref 81.4–97.8)
MICRO URNS: NORMAL
MONOCYTES # BLD AUTO: 0.6 K/UL (ref 0–0.85)
MONOCYTES NFR BLD AUTO: 7.5 % (ref 0–13.4)
NEUTROPHILS # BLD AUTO: 4.8 K/UL (ref 1.82–7.42)
NEUTROPHILS NFR BLD: 59.7 % (ref 44–72)
NITRITE UR QL STRIP.AUTO: NEGATIVE
NRBC # BLD AUTO: 0 K/UL
NRBC BLD-RTO: 0 /100 WBC (ref 0–0.2)
NUMBER OF RH DOSES IND 8505RD: NORMAL
PH UR STRIP.AUTO: 7 [PH] (ref 5–8)
PLATELET # BLD AUTO: 266 K/UL (ref 164–446)
PMV BLD AUTO: 9.4 FL (ref 9–12.9)
POTASSIUM SERPL-SCNC: 3.9 MMOL/L (ref 3.6–5.5)
PROT SERPL-MCNC: 7.5 G/DL (ref 6–8.2)
PROT UR QL STRIP: NEGATIVE MG/DL
RBC # BLD AUTO: 4.29 M/UL (ref 4.2–5.4)
RBC UR QL AUTO: NEGATIVE
RH BLD: NORMAL
SODIUM SERPL-SCNC: 137 MMOL/L (ref 135–145)
SP GR UR STRIP.AUTO: 1.01
WBC # BLD AUTO: 8 K/UL (ref 4.8–10.8)

## 2024-10-03 PROCEDURE — 76801 OB US < 14 WKS SINGLE FETUS: CPT

## 2024-10-03 PROCEDURE — 81003 URINALYSIS AUTO W/O SCOPE: CPT

## 2024-10-03 PROCEDURE — 36415 COLL VENOUS BLD VENIPUNCTURE: CPT

## 2024-10-03 PROCEDURE — 80053 COMPREHEN METABOLIC PANEL: CPT

## 2024-10-03 PROCEDURE — 84702 CHORIONIC GONADOTROPIN TEST: CPT

## 2024-10-03 PROCEDURE — 99283 EMERGENCY DEPT VISIT LOW MDM: CPT

## 2024-10-03 PROCEDURE — 85025 COMPLETE CBC W/AUTO DIFF WBC: CPT

## 2024-10-03 PROCEDURE — 84703 CHORIONIC GONADOTROPIN ASSAY: CPT

## 2024-10-03 PROCEDURE — 86901 BLOOD TYPING SEROLOGIC RH(D): CPT

## 2024-10-05 ENCOUNTER — HOSPITAL ENCOUNTER (OUTPATIENT)
Dept: LAB | Facility: MEDICAL CENTER | Age: 29
End: 2024-10-05
Attending: EMERGENCY MEDICINE
Payer: COMMERCIAL

## 2024-10-05 DIAGNOSIS — Z97.5 IUD (INTRAUTERINE DEVICE) IN PLACE: ICD-10-CM

## 2024-10-05 DIAGNOSIS — Z34.90 EARLY STAGE OF PREGNANCY: ICD-10-CM

## 2024-10-05 PROCEDURE — 84702 CHORIONIC GONADOTROPIN TEST: CPT

## 2024-10-06 LAB — B-HCG SERPL-ACNC: ABNORMAL MIU/ML (ref 0–5)

## 2024-10-07 ENCOUNTER — OFFICE VISIT (OUTPATIENT)
Dept: OBGYN | Facility: CLINIC | Age: 29
End: 2024-10-07
Attending: EMERGENCY MEDICINE
Payer: COMMERCIAL

## 2024-10-07 DIAGNOSIS — Z30.432 ENCOUNTER FOR IUD REMOVAL: ICD-10-CM

## 2024-10-07 DIAGNOSIS — O26.30 PREGNANCY WITH IUD IN PLACE, ANTEPARTUM: Primary | ICD-10-CM

## 2024-10-07 PROCEDURE — 58301 REMOVE INTRAUTERINE DEVICE: CPT | Performed by: OBSTETRICS & GYNECOLOGY

## 2024-11-04 RX ORDER — SERTRALINE HYDROCHLORIDE 100 MG/1
100 TABLET, FILM COATED ORAL DAILY
Qty: 90 TABLET | Refills: 3 | Status: SHIPPED | OUTPATIENT
Start: 2024-11-04

## 2025-01-24 ENCOUNTER — HOSPITAL ENCOUNTER (OUTPATIENT)
Dept: LAB | Facility: MEDICAL CENTER | Age: 30
End: 2025-01-24
Attending: FAMILY MEDICINE
Payer: COMMERCIAL

## 2025-01-24 ENCOUNTER — OFFICE VISIT (OUTPATIENT)
Dept: MEDICAL GROUP | Facility: MEDICAL CENTER | Age: 30
End: 2025-01-24
Payer: COMMERCIAL

## 2025-01-24 VITALS
HEIGHT: 64 IN | WEIGHT: 157.2 LBS | OXYGEN SATURATION: 97 % | BODY MASS INDEX: 26.84 KG/M2 | DIASTOLIC BLOOD PRESSURE: 62 MMHG | HEART RATE: 90 BPM | SYSTOLIC BLOOD PRESSURE: 110 MMHG | TEMPERATURE: 99.7 F

## 2025-01-24 DIAGNOSIS — R11.0 NAUSEA: ICD-10-CM

## 2025-01-24 DIAGNOSIS — R10.10 UPPER ABDOMINAL PAIN: ICD-10-CM

## 2025-01-24 LAB
ALBUMIN SERPL BCP-MCNC: 4.4 G/DL (ref 3.2–4.9)
ALBUMIN/GLOB SERPL: 1.5 G/DL
ALP SERPL-CCNC: 73 U/L (ref 30–99)
ALT SERPL-CCNC: 12 U/L (ref 2–50)
AMYLASE SERPL-CCNC: 54 U/L (ref 25–125)
ANION GAP SERPL CALC-SCNC: 13 MMOL/L (ref 7–16)
AST SERPL-CCNC: 18 U/L (ref 12–45)
BASOPHILS # BLD AUTO: 0.7 % (ref 0–1.8)
BASOPHILS # BLD: 0.05 K/UL (ref 0–0.12)
BILIRUB SERPL-MCNC: 0.3 MG/DL (ref 0.1–1.5)
BUN SERPL-MCNC: 10 MG/DL (ref 8–22)
CALCIUM ALBUM COR SERPL-MCNC: 8.6 MG/DL (ref 8.5–10.5)
CALCIUM SERPL-MCNC: 8.9 MG/DL (ref 8.4–10.2)
CHLORIDE SERPL-SCNC: 107 MMOL/L (ref 96–112)
CO2 SERPL-SCNC: 20 MMOL/L (ref 20–33)
CREAT SERPL-MCNC: 0.69 MG/DL (ref 0.5–1.4)
EOSINOPHIL # BLD AUTO: 0.2 K/UL (ref 0–0.51)
EOSINOPHIL NFR BLD: 2.8 % (ref 0–6.9)
ERYTHROCYTE [DISTWIDTH] IN BLOOD BY AUTOMATED COUNT: 41 FL (ref 35.9–50)
FASTING STATUS PATIENT QL REPORTED: NORMAL
GFR SERPLBLD CREATININE-BSD FMLA CKD-EPI: 119 ML/MIN/1.73 M 2
GLOBULIN SER CALC-MCNC: 2.9 G/DL (ref 1.9–3.5)
GLUCOSE SERPL-MCNC: 122 MG/DL (ref 65–99)
HCT VFR BLD AUTO: 39.3 % (ref 37–47)
HGB BLD-MCNC: 13.1 G/DL (ref 12–16)
IMM GRANULOCYTES # BLD AUTO: 0.01 K/UL (ref 0–0.11)
IMM GRANULOCYTES NFR BLD AUTO: 0.1 % (ref 0–0.9)
LIPASE SERPL-CCNC: 36 U/L (ref 11–82)
LYMPHOCYTES # BLD AUTO: 3.05 K/UL (ref 1–4.8)
LYMPHOCYTES NFR BLD: 42.5 % (ref 22–41)
MCH RBC QN AUTO: 30.6 PG (ref 27–33)
MCHC RBC AUTO-ENTMCNC: 33.3 G/DL (ref 32.2–35.5)
MCV RBC AUTO: 91.8 FL (ref 81.4–97.8)
MONOCYTES # BLD AUTO: 0.51 K/UL (ref 0–0.85)
MONOCYTES NFR BLD AUTO: 7.1 % (ref 0–13.4)
NEUTROPHILS # BLD AUTO: 3.35 K/UL (ref 1.82–7.42)
NEUTROPHILS NFR BLD: 46.8 % (ref 44–72)
NRBC # BLD AUTO: 0 K/UL
NRBC BLD-RTO: 0 /100 WBC (ref 0–0.2)
PLATELET # BLD AUTO: 283 K/UL (ref 164–446)
PMV BLD AUTO: 9.5 FL (ref 9–12.9)
POTASSIUM SERPL-SCNC: 3.8 MMOL/L (ref 3.6–5.5)
PROT SERPL-MCNC: 7.3 G/DL (ref 6–8.2)
RBC # BLD AUTO: 4.28 M/UL (ref 4.2–5.4)
SODIUM SERPL-SCNC: 140 MMOL/L (ref 135–145)
WBC # BLD AUTO: 7.2 K/UL (ref 4.8–10.8)

## 2025-01-24 PROCEDURE — 36415 COLL VENOUS BLD VENIPUNCTURE: CPT

## 2025-01-24 PROCEDURE — 83690 ASSAY OF LIPASE: CPT

## 2025-01-24 PROCEDURE — 80053 COMPREHEN METABOLIC PANEL: CPT

## 2025-01-24 PROCEDURE — 3078F DIAST BP <80 MM HG: CPT | Performed by: FAMILY MEDICINE

## 2025-01-24 PROCEDURE — 85025 COMPLETE CBC W/AUTO DIFF WBC: CPT

## 2025-01-24 PROCEDURE — 99214 OFFICE O/P EST MOD 30 MIN: CPT | Performed by: FAMILY MEDICINE

## 2025-01-24 PROCEDURE — 3074F SYST BP LT 130 MM HG: CPT | Performed by: FAMILY MEDICINE

## 2025-01-24 PROCEDURE — 82150 ASSAY OF AMYLASE: CPT

## 2025-01-24 RX ORDER — ONDANSETRON 4 MG/1
4 TABLET, ORALLY DISINTEGRATING ORAL EVERY 6 HOURS PRN
Qty: 10 TABLET | Refills: 0 | Status: SHIPPED | OUTPATIENT
Start: 2025-01-24

## 2025-01-24 ASSESSMENT — FIBROSIS 4 INDEX: FIB4 SCORE: 0.58

## 2025-01-25 NOTE — PROGRESS NOTES
Verbal consent was acquired by the patient to use [employer] ambient listening note generation during this visit:  Yes      Chief complaint::Diagnoses of Upper abdominal pain and Nausea were pertinent to this visit.    Assessment and Plan:   The following treatment plan was discussed:     Assessment & Plan  1. Left upper quadrant pain: Chronic, recurring pain with recent changes. Increased tenderness on left abdominal palpation, shoulder pain, and nausea. Previous ultrasound showed normal gallbladder. Pain after greasy food, no abdominal distension, no significant trauma. Possible spleen issues discussed.  - Ordered abdominal ultrasound (right, mid, left upper quadrants) and HIDA scan  - Blood tests (CBC, CMP, GFR, lipase, amylase) ordered  - Prescribed Zofran for nausea  - Routine ultrasound if blood work normal; stat ultrasound within 24-48 hours if abnormalities  - HIDA scan if ultrasound negative but pain persists  - ER visit advised if severe pain over the weekend    Follow-up  - Follow up with results  Kyleigh was seen today for gallbladder.    Diagnoses and all orders for this visit:    Upper abdominal pain  -     CBC WITH DIFFERENTIAL; Future  -     Cancel: Comp Metabolic Panel; Future  -     ESTIMATED GFR; Future  -     LIPASE; Future  -     AMYLASE; Future  -     US-ABDOMEN COMPLETE SURVEY; Future  -     NM-BILIARY HIDA SCAN FATTY MEAL; Future  -     Comp Metabolic Panel; Future    Nausea  -     ondansetron (ZOFRAN ODT) 4 MG TABLET DISPERSIBLE; Take 1 Tablet by mouth every 6 hours as needed for Nausea/Vomiting.        Followup: Return if symptoms worsen or fail to improve.    Subjective/HPI:     HPI:    Kyleigh Adams is a pleasant 29 y.o. female here for   Chief Complaint   Patient presents with    Gallbladder     Upper left middle stomach pain, pain radiates to shoulders, nausea before pain began, Began Sunday        History of Present Illness  The patient is a 29-year-old individual with left upper  quadrant pain.    They report sharp, cramp-like pain in the upper abdomen, radiating from the left side to the center. An episode on Sunday extended to their shoulders, a new symptom. This pain is similar to episodes since high school. No recent high-impact trauma. Pain correlates with large or fatty meals, typically in the upper central abdomen. The most recent episode began Sunday night, with intense pain subsiding in 2 hours but milder pain persisting through Monday. They continue to experience nausea and vomiting. An ultrasound last summer showed no gallbladder abnormalities. Previously diagnosed with an ovarian cyst in high school, believed to be the cause of their symptoms.    Current Medicines (including changes today)  Current Outpatient Medications   Medication Sig Dispense Refill    ondansetron (ZOFRAN ODT) 4 MG TABLET DISPERSIBLE Take 1 Tablet by mouth every 6 hours as needed for Nausea/Vomiting. 10 Tablet 0    sertraline (ZOLOFT) 100 MG Tab TAKE 1 TABLET DAILY 90 Tablet 3     No current facility-administered medications for this visit.     Past Medical/ Surgical History  She  has a past medical history of Allergy, Anxiety, Chronic fatigue syndrome, Depression, Hyperlipidemia, and Migraine.    She has no past medical history of Addisons disease (HCC), Adrenal disorder (HCC), Anemia, Arrhythmia, Arthritis, Asthma, Blood transfusion without reported diagnosis, BRCA1 negative, BRCA1 positive, BRCA2 gene mutation positive, BRCA2 negative, Breast cancer (HCC), Cancer (HCC), Cancer of peritoneum (HCC), Cataract, CHF (congestive heart failure) (HCC), Clotting disorder (HCC), COPD (chronic obstructive pulmonary disease) (HCC), Cushings syndrome (HCC), Diabetes (HCC), Diabetic neuropathy (HCC), GERD (gastroesophageal reflux disease), Glaucoma, Goiter, Head ache, Heart attack (HCC), Heart murmur, HIV (human immunodeficiency virus infection) ([facility]), Hypertension, IBD (inflammatory bowel disease), Kidney disease,  "Malignant neoplasm of fallopian tube (HCC), Meningitis, Muscle disorder, Osteoporosis, Ovarian cancer (HCC), Parathyroid disorder (HCC), Pituitary disease (HCC), Pulmonary emphysema (HCC), Seizure (HCC), Sickle cell disease (HCC), Stroke (HCC), Substance abuse (HCC), Thyroid disease, Tuberculosis, or Urinary tract infection.  She  has a past surgical history that includes dental extraction(s) and primary c section (N/A, 08/15/2022).       Objective:   /62 (BP Location: Left arm, Patient Position: Sitting, BP Cuff Size: Adult)   Pulse 90   Temp 37.6 °C (99.7 °F) (Temporal)   Ht 1.626 m (5' 4\")   Wt 71.3 kg (157 lb 3.2 oz)   SpO2 97%  Body mass index is 26.98 kg/m².    Physical Exam  Constitutional:       General: She is not in acute distress.     Appearance: She is not ill-appearing or toxic-appearing.   HENT:      Head: Normocephalic.      Right Ear: Tympanic membrane and external ear normal.      Left Ear: Tympanic membrane and external ear normal.      Nose: Nose normal. No rhinorrhea.      Mouth/Throat:      Mouth: Mucous membranes are moist.      Pharynx: Oropharynx is clear. No posterior oropharyngeal erythema.   Eyes:      General:         Right eye: No discharge.         Left eye: No discharge.      Conjunctiva/sclera: Conjunctivae normal.      Pupils: Pupils are equal, round, and reactive to light.   Cardiovascular:      Rate and Rhythm: Normal rate and regular rhythm.      Heart sounds: No murmur heard.  Pulmonary:      Effort: Pulmonary effort is normal. No respiratory distress.      Breath sounds: Normal breath sounds. No wheezing.   Abdominal:      General: Abdomen is flat. Bowel sounds are normal. There is no distension.      Palpations: Abdomen is soft.      Tenderness: There is abdominal tenderness in the epigastric area and left upper quadrant.   Musculoskeletal:         General: No swelling or tenderness.      Cervical back: Normal range of motion and neck supple.      Right lower leg: No " edema.      Left lower leg: No edema.   Skin:     General: Skin is warm.   Neurological:      General: No focal deficit present.      Mental Status: She is alert and oriented to person, place, and time. Mental status is at baseline.   Psychiatric:         Mood and Affect: Mood normal.          Lab/ Imaging Results:  No labs or imaging to review    Please note that this dictation was created using voice recognition software. I have made every reasonable attempt to correct obvious errors, but I expect that there are errors of grammar and possibly content that I did not discover before finalizing the note.

## 2025-02-13 ENCOUNTER — HOSPITAL ENCOUNTER (OUTPATIENT)
Dept: RADIOLOGY | Facility: MEDICAL CENTER | Age: 30
End: 2025-02-13
Attending: FAMILY MEDICINE
Payer: COMMERCIAL

## 2025-02-13 ENCOUNTER — OFFICE VISIT (OUTPATIENT)
Dept: DERMATOLOGY | Facility: IMAGING CENTER | Age: 30
End: 2025-02-13
Payer: COMMERCIAL

## 2025-02-13 ENCOUNTER — RESULTS FOLLOW-UP (OUTPATIENT)
Dept: MEDICAL GROUP | Facility: MEDICAL CENTER | Age: 30
End: 2025-02-13

## 2025-02-13 DIAGNOSIS — R10.10 UPPER ABDOMINAL PAIN: ICD-10-CM

## 2025-02-13 DIAGNOSIS — D22.9 MULTIPLE NEVI: ICD-10-CM

## 2025-02-13 DIAGNOSIS — D23.9 DERMATOFIBROMA: ICD-10-CM

## 2025-02-13 DIAGNOSIS — L81.4 LENTIGINES: ICD-10-CM

## 2025-02-13 DIAGNOSIS — Z12.83 SKIN CANCER SCREENING: ICD-10-CM

## 2025-02-13 DIAGNOSIS — L82.1 SK (SEBORRHEIC KERATOSIS): ICD-10-CM

## 2025-02-13 DIAGNOSIS — D18.01 CHERRY ANGIOMA: ICD-10-CM

## 2025-02-13 PROCEDURE — 76700 US EXAM ABDOM COMPLETE: CPT

## 2025-02-13 PROCEDURE — 99212 OFFICE O/P EST SF 10 MIN: CPT | Performed by: NURSE PRACTITIONER

## 2025-02-13 NOTE — PROGRESS NOTES
DERMATOLOGY NOTE  FOLLOW UP VISIT       Chief complaint: Annual Exam (Full body skin exam )       Full Body Exam       History of skin cancer: NO  History of precancers/actinic keratoses: No  History of biopsies:No  History of blistering/severe sunburns:Yes, Details:    Family history of skin cancer:Yes, Details: Maternal Grandfather, melanoma   Family history of atypical moles:No      Allergies   Allergen Reactions    Miralax [Polyethylene Glycol] Runny Nose and Itching     Itchy throat, and runny nose        MEDICATIONS:  Medications relevant to specialty reviewed.     REVIEW OF SYSTEMS:   Positive for skin (see HPI)  Negative for fevers and chills       EXAM:  There were no vitals taken for this visit.  Constitutional: Well-developed, well-nourished, and in no distress.     A total body skin exam was performed excluding the genitals per patient preference and including the following areas: head (including face), neck, chest, abdomen, groin/buttocks, back, bilateral upper extremities, and bilateral lower extremities with the following pertinent findings listed below and/or in assessment/plan.    - sun exposed skin of trunk and b/l upper, lower extremities and face with very few scattered clinically benign light brown reticulated macules all of which were morphologically similar and none of which were suspicious for skin cancer today on exam     Very few scattered 1-3mm bright red macules and thin papules on the trunk and extremites     Multiple light brown medium brown skin-colored macules papules scattered over the trunk >> extremities, including lower extremities and face, All with benign-appearing pigment network patterns on dermoscopy     few tan stuck-on waxy papules scattered on the Posterior bilateral lower extremities        IDN right axilla/upper extremity     DF RT pretibial region, left posterior shoulder        IMPRESSION / PLAN:    1. Lentigines  - Benign-appearing nature of lesions discussed during  exam.   - Advised to continue to monitor for any return to clinic for new or concerning changes.      2. Cherry angioma  - Benign-appearing nature of lesions discussed during exam.   - Advised to continue to monitor for any return to clinic for new or concerning changes.      3. Multiple nevi  - Benign-appearing nature of lesions discussed during exam.   - Advised to continue to monitor for any return to clinic for new or concerning changes.      4. SK (seborrheic keratosis)  - Benign-appearing nature of lesions discussed during exam.   - Advised to continue to monitor for any return to clinic for new or concerning changes.      5. Dermatofibroma  - Benign-appearing nature of lesions discussed during exam.   - Advised to continue to monitor for any return to clinic for new or concerning changes.      6. Skin cancer screening  Skin cancer education  discussed importance of sun protective clothing, eyewear in addition to the use of broad spectrum sunscreen with SPF 30 or greater, as well as need for reapplication ~every 2 hours when exposed to UVR/handout previously given  discussed importance following up for any new or changing lesions as noted in handout given, but every 12 months exams in clinic in the setting of dermatologic history  ABCDE's of melanoma discussed/handout previously given      Please note that this dictation was created using voice recognition software. I have made every reasonable attempt to correct obvious errors, but I expect that there are errors of grammar and possibly content that I did not discover before finalizing the note.      Return to clinic in: Return in about 1 year (around 2/13/2026) for MARIVEL. and as needed for any new or changing skin lesions.

## 2025-02-19 ENCOUNTER — APPOINTMENT (OUTPATIENT)
Dept: RADIOLOGY | Facility: MEDICAL CENTER | Age: 30
End: 2025-02-19
Attending: FAMILY MEDICINE
Payer: COMMERCIAL

## 2025-09-02 ENCOUNTER — APPOINTMENT (OUTPATIENT)
Dept: MEDICAL GROUP | Facility: MEDICAL CENTER | Age: 30
End: 2025-09-02
Payer: COMMERCIAL

## 2025-10-21 ENCOUNTER — APPOINTMENT (OUTPATIENT)
Dept: MEDICAL GROUP | Facility: MEDICAL CENTER | Age: 30
End: 2025-10-21
Payer: COMMERCIAL

## (undated) DEVICE — SUTURE ABSORBABLE MONOFILAMENT VIOLET MONOCRYL CT-1 L36 IN (36EA/BX)

## (undated) DEVICE — SODIUM CHL IRRIGATION 0.9% 1000ML (12EA/CA)

## (undated) DEVICE — SET EXTENSION WITH 2 PORTS (48EA/CA) ***PART #2C8610 IS A SUBSTITUTE*****

## (undated) DEVICE — SOLUTION PLASMA-LYTE PH 7.4 INJ 1000ML  (14EA/CA)

## (undated) DEVICE — CHLORAPREP 26 ML APPLICATOR - ORANGE TINT(25/CA)

## (undated) DEVICE — CLOSURE SKIN STRIP 1/2 X 4 IN - (STERI STRIP) (50/BX 4BX/CA)

## (undated) DEVICE — SUTURE 4-0 MONOCRYL PLUS PS-1 - 27 INCH (36/BX)

## (undated) DEVICE — CANISTER SUCTION 3000ML MECHANICAL FILTER AUTO SHUTOFF MEDI-VAC NONSTERILE LF DISP  (40EA/CA)

## (undated) DEVICE — WATER IRRIGATION STERILE 1000ML (12EA/CA)

## (undated) DEVICE — PLUMEPEN ULTRA 3/8 IN X 10 FT HOSE (20EA/CA)

## (undated) DEVICE — SUTURE 3-0 VICRYL PLUS CT-1 - 36 INCH (36/BX)

## (undated) DEVICE — RETRACTOR O C SECTION LRY - (5/BX)

## (undated) DEVICE — SUTURE 2-0 MONOCRYL CT-1

## (undated) DEVICE — GLOVE BIOGEL SZ 6 PF LATEX - (50EA/BX 4BX/CA)

## (undated) DEVICE — BLANKET WARMING LOWER BODY (10EA/CA)

## (undated) DEVICE — STERI STRIP COMPOUND BENZOIN - TINCTURE 0.6ML WITH APPLICATOR (40EA/BX)

## (undated) DEVICE — PACK C-SECTION (2EA/CA)

## (undated) DEVICE — DRESSING POST OP BORDER 4 X 10 (5EA/BX)

## (undated) DEVICE — TUBING CLEARLINK DUO-VENT - C-FLO (48EA/CA)

## (undated) DEVICE — KIT  I.V. START (100EA/CA)

## (undated) DEVICE — CATHETER IV NON-SAFETY 18 GA X 1 1/4 (50/BX 4BX/CA)

## (undated) DEVICE — TRAY SPINAL ANESTHESIA NON-SAFETY (10/CA)

## (undated) DEVICE — SUTURE 0 VICRYL PLUS CT-1 - 36 INCH (36/BX)

## (undated) DEVICE — HEAD HOLDER JUNIOR/ADULT

## (undated) DEVICE — SUTURE 3-0 MONOCRYL CT-1 ABS - 36IN (36/BX)

## (undated) DEVICE — SLEEVE, SEQUENTIAL CALF REG

## (undated) DEVICE — PACK ROOM TURNOVER L&D (12/CA)